# Patient Record
Sex: MALE | Race: BLACK OR AFRICAN AMERICAN | Employment: UNEMPLOYED | URBAN - METROPOLITAN AREA
[De-identification: names, ages, dates, MRNs, and addresses within clinical notes are randomized per-mention and may not be internally consistent; named-entity substitution may affect disease eponyms.]

---

## 2017-01-31 ENCOUNTER — OFFICE VISIT (OUTPATIENT)
Dept: INTERNAL MEDICINE CLINIC | Age: 42
End: 2017-01-31

## 2017-01-31 VITALS
HEIGHT: 67 IN | SYSTOLIC BLOOD PRESSURE: 128 MMHG | HEART RATE: 91 BPM | BODY MASS INDEX: 22.84 KG/M2 | TEMPERATURE: 98.1 F | RESPIRATION RATE: 18 BRPM | WEIGHT: 145.5 LBS | OXYGEN SATURATION: 100 % | DIASTOLIC BLOOD PRESSURE: 65 MMHG

## 2017-01-31 DIAGNOSIS — E78.5 HYPERLIPIDEMIA, UNSPECIFIED HYPERLIPIDEMIA TYPE: ICD-10-CM

## 2017-01-31 DIAGNOSIS — M79.672 LEFT FOOT PAIN: Primary | ICD-10-CM

## 2017-01-31 LAB
CHOLEST SERPL-MCNC: 220 MG/DL
HDLC SERPL-MCNC: 29 MG/DL
LDL CHOLESTEROL POC: 149
NON-HDL GOAL (POC): 191
TCHOL/HDL RATIO (POC): 7.5
TRIGL SERPL-MCNC: 208 MG/DL

## 2017-01-31 RX ORDER — TRAZODONE HYDROCHLORIDE 100 MG/1
100 TABLET ORAL
Refills: 0 | COMMUNITY
Start: 2016-12-14 | End: 2017-08-28

## 2017-01-31 RX ORDER — FLUOXETINE HYDROCHLORIDE 20 MG/1
CAPSULE ORAL
Refills: 0 | COMMUNITY
Start: 2016-12-14 | End: 2017-08-28

## 2017-01-31 RX ORDER — OXYCODONE HYDROCHLORIDE 5 MG/1
TABLET ORAL
Refills: 0 | COMMUNITY
Start: 2016-11-21 | End: 2019-07-02

## 2017-01-31 RX ORDER — DEXTROMETHORPHAN HYDROBROMIDE, GUAIFENESIN 5; 100 MG/5ML; MG/5ML
650 LIQUID ORAL
Qty: 100 TAB | Refills: 3 | Status: SHIPPED | OUTPATIENT
Start: 2017-01-31

## 2017-01-31 RX ORDER — ALBUTEROL SULFATE 90 UG/1
AEROSOL, METERED RESPIRATORY (INHALATION)
Refills: 0 | COMMUNITY
Start: 2016-11-09

## 2017-01-31 RX ORDER — HYDROXYZINE PAMOATE 25 MG/1
25 CAPSULE ORAL
Refills: 0 | COMMUNITY
Start: 2016-12-14 | End: 2017-08-28 | Stop reason: SDUPTHER

## 2017-01-31 RX ORDER — OXYCODONE HYDROCHLORIDE 5 MG/1
TABLET ORAL
Refills: 0 | Status: CANCELLED | OUTPATIENT
Start: 2017-01-31

## 2017-01-31 NOTE — PROGRESS NOTES
1. Have you been to the ER, urgent care clinic since your last visit? Hospitalized since your last visit? No    2. Have you seen or consulted any other health care providers outside of the [address] [place] [address] since your last visit? Include any pap smears or colon screening. No      Pt is here for   Chief Complaint   Patient presents with    Foot Pain     pt states left foot. .. pt states he had surgery on his foot in october. . pt states he has contacted the surgeon to inform him that he's still having pain. .. pt states he was told to contact his PCP     Pt states pain level is a 9 left foot. ..  Pt states he hasn't had anything for pain in quite sometime

## 2017-01-31 NOTE — MR AVS SNAPSHOT
Visit Information Date & Time Provider Department Dept. Phone Encounter #  
 1/31/2017  1:20 PM Bonnye Dakins, NP [address] [facility] [phone] [id number] Follow-up Instructions Return in about 3 months (around 4/30/2017) for hyperlipidemia. Upcoming Health Maintenance Date Due DTaP/Tdap/Td series (2 - Td) 10/10/2026 Allergies as of 1/31/2017  Review Complete On: 1/31/2017 By: Bonnye Dakins, NP Severity Noted Reaction Type Reactions Ibuprofen  07/26/2014    Itching Pcn [Penicillins]  07/26/2014    Itching Current Immunizations  Reviewed on 2/20/2015 Name Date Influenza Vaccine 11/12/2013 Influenza Vaccine Split 10/31/2012 TB Skin Test (PPD) Intradermal 1/23/2015 Not reviewed this visit You Were Diagnosed With   
  
 Codes Comments Left foot pain    -  Primary ICD-10-CM: R89.060 ICD-9-CM: 729.5 Hyperlipidemia, unspecified hyperlipidemia type     ICD-10-CM: E78.5 ICD-9-CM: 272.4 Vitals BP Pulse Temp Resp Height(growth percentile) Weight(growth percentile) 128/65 (BP 1 Location: Right arm, BP Patient Position: Sitting) 91 98.1 °F (36.7 °C) (Oral) 18 5' 7\" (1.702 m) 145 lb 8 oz (66 kg) SpO2 BMI Smoking Status 100% 22.79 kg/m2 Current Every Day Smoker Vitals History BMI and BSA Data Body Mass Index Body Surface Area  
 22.79 kg/m 2 1.77 m 2 Preferred Pharmacy Pharmacy Name Phone [id number] [facility] [address] [place] [address] Your Updated Medication List  
  
   
This list is accurate as of: 1/31/17  2:22 PM.  Always use your most recent med list.  
  
  
  
  
 ABILIFY 10 mg tablet Generic drug:  ARIPiprazole Take 10 mg by mouth daily. acetaminophen 650 mg CR tablet Commonly known as:  TYLENOL ARTHRITIS PAIN Take 1 Tab by mouth three (3) times daily as needed for Pain.  Indications: ARTHRITIC PAIN  
  
 atenolol 25 mg tablet Commonly known as:  TENORMIN Take 1 Tab by mouth daily. Indications: HYPERTENSION  
  
 atorvastatin 40 mg tablet Commonly known as:  LIPITOR Take 1 Tab by mouth daily. Indications: cholesterol  
  
 ergocalciferol 50,000 unit capsule Commonly known as:  ERGOCALCIFEROL Take 1 Cap by mouth every seven (7) days. Indications: VITAMIN D DEFICIENCY FLUoxetine 20 mg capsule Commonly known as:  PROzac  
  
 guaiFENesin  mg SR tablet Commonly known as:  Mookie & Mookie Take 1 Tab by mouth two (2) times daily as needed for Congestion. hydrOXYzine pamoate 25 mg capsule Commonly known as:  VISTARIL  
  
 ipratropium 17 mcg/actuation inhaler Commonly known as:  ATROVENT HFA Take 2 puffs TWICE DAILY, up to four times daily for shortness of breath and wheezing. Indications: BRONCHOSPASM PREVENTION WITH COPD  
  
 lisinopril 20 mg tablet Commonly known as:  Delsie Commander Take 1 Tab by mouth daily. Indications: HYPERTENSION  
  
 omeprazole 40 mg capsule Commonly known as:  PRILOSEC Take 1 Cap by mouth daily. Indications: GASTROESOPHAGEAL REFLUX, HEARTBURN  
  
 oxyCODONE IR 5 mg immediate release tablet Commonly known as:  ROXICODONE  
take 1 tablet by mouth every 4 hours if needed for pain  
  
 traZODone 100 mg tablet Commonly known as:  DESYREL  
  
 VENTOLIN HFA 90 mcg/actuation inhaler Generic drug:  albuterol  
inhale 2 puffs by mouth every 6 hours if needed for BREATH OR WHEEZING  
  
 WELLBUTRIN 100 mg tablet Generic drug:  buPROPion Take  by mouth. Prescriptions Sent to Pharmacy Refills  
 acetaminophen (TYLENOL ARTHRITIS PAIN) 650 mg CR tablet 3 Sig: Take 1 Tab by mouth three (3) times daily as needed for Pain. Indications: ARTHRITIC PAIN Class: Normal  
 [facility] [address] Ph #: [phone] Route: Oral  
  
We Performed the Following AMB POC LIPID PROFILE [24233 CPT(R)] Follow-up Instructions Return in about 3 months (around 4/30/2017) for hyperlipidemia. Patient Instructions Foot Pain: Care Instructions Your Care Instructions Foot injuries that cause pain and swelling are fairly common. Almost all sports or home repair projects can cause a misstep that ends up as foot pain. Normal wear and tear, especially as you get older, also can cause foot pain. Most minor foot injuries will heal on their own, and home treatment is usually all you need to do. If you have a severe injury, you may need tests and treatment. Follow-up care is a key part of your treatment and safety. Be sure to make and go to all appointments, and call your doctor if you are having problems. Its also a good idea to know your test results and keep a list of the medicines you take. How can you care for yourself at home? · Take pain medicines exactly as directed. ¨ If the doctor gave you a prescription medicine for pain, take it as prescribed. ¨ If you are not taking a prescription pain medicine, ask your doctor if you can take an over-the-counter medicine. · Rest and protect your foot. Take a break from any activity that may cause pain. · Put ice or a cold pack on your foot for 10 to 20 minutes at a time. Put a thin cloth between the ice and your skin. · Prop up the sore foot on a pillow when you ice it or anytime you sit or lie down during the next 3 days. Try to keep it above the level of your heart. This will help reduce swelling. · Your doctor may recommend that you wrap your foot with an elastic bandage. Keep your foot wrapped for as long as your doctor advises. · If your doctor recommends crutches, use them as directed. · Wear roomy footwear. · As soon as pain and swelling end, begin gentle exercises of your foot. Your doctor can tell you which exercises will help. When should you call for help? Call 911 anytime you think you may need emergency care. For example, call if: 
· Your foot turns pale, white, blue, or cold. Call your doctor now or seek immediate medical care if: 
· You cannot move or stand on your foot. · Your foot looks twisted or out of its normal position. · Your foot is not stable when you step down. · You have signs of infection, such as: 
¨ Increased pain, swelling, warmth, or redness. ¨ Red streaks leading from the sore area. ¨ Pus draining from a place on your foot. ¨ A fever. · Your foot is numb or tingly. Watch closely for changes in your health, and be sure to contact your doctor if: 
· You do not get better as expected. · You have bruises from an injury that last longer than 2 weeks. Where can you learn more? Go to http://sonia-brenda.info/. Enter V003 in the search box to learn more about \"Foot Pain: Care Instructions. \" Current as of: May 23, 2016 Content Version: 11.1 © 8700-9601 [employer]. Care instructions adapted under license by [employer] (which disclaims liability or warranty for this information). If you have questions about a medical condition or this instruction, always ask your healthcare professional. [place] [address] any warranty or liability for your use of this information. Introducing [facility]! [facility] [facility] introduces K[employer]Ex patient portal. Now you can access parts of your medical record, email your doctor's office, and request medication refills online. 1. In your internet browser, go to [email or web address] 2. Click on the First Time User? Click Here link in the Sign In box. You will see the New Member Sign Up page. 3. Enter your [employer] Access Code exactly as it appears below. You will not need to use this code after youve completed the sign-up process. If you do not sign up before the expiration date, you must request a new code. · [employer] Access Code: [email or web address] Expires: 5/1/2017  1:17 PM 
 
4. Enter the last four digits of your Social Security Number (xxxx) and Date of Birth (mm/dd/yyyy) as indicated and click Submit. You will be taken to the next sign-up page. 5. Create a [employer] ID. This will be your [employer] login ID and cannot be changed, so think of one that is secure and easy to remember. 6. Create a [employer] password. You can change your password at any time. 7. Enter your Password Reset Question and Answer. This can be used at a later time if you forget your password. 8. Enter your e-mail address. You will receive e-mail notification when new information is available in [address] 9. Click Sign Up. You can now view and download portions of your medical record. 10. Click the Download Summary menu link to download a portable copy of your medical information. If you have questions, please visit the Frequently Asked Questions section of the [employer] website. Remember, [employer] is NOT to be used for urgent needs. For medical emergencies, dial 911. Now available from your iPhone and Android! Please provide this summary of care documentation to your next provider. Your primary care clinician is listed as VLAD Abel. If you have any questions after today's visit, please call [phone].

## 2017-01-31 NOTE — PATIENT INSTRUCTIONS

## 2017-01-31 NOTE — PROGRESS NOTES
Michelle Grier is a 35 y.o. male and presents with Foot Pain (pt states left foot. .. pt states he had surgery on his foot in october. . pt states he has contacted the surgeon to inform him that he's still having pain. .. pt states he was told to contact his PCP)    Subjective:  Pt here chronic left foot pain. No meds tried to help with pain since \"not helpful\", taken once weekly. Had \"surgery\" November 2016 by Dr. Quin Garcia 2 tabs only. Also NOT taking cholesterol med, since \"didn't feel like taking.     Review of Systems  Constitutional: negative for fevers, chills, anorexia and weight loss  Respiratory:  negative for cough, hemoptysis, dyspnea, and wheezing  CV:   negative for chest pain, palpitations, and lower extremity edema  GI:   negative for nausea, vomiting, diarrhea, abdominal pain, and melena  Integument:  negative for rash, ulcerations, and pruritus  Hematologic:  negative for easy bruising and bleeding  Musculoskel: negative for muscle weakness and joint pain/swelling  Neurological:  negative for headaches, dizziness, vertigo,and memory/gait problems  Behavl/Psych: negative for feelings of anxiety, depression, suicide, and mood changes    Past Medical History   Diagnosis Date    Bipolar affective ([facility] [address]     Depression     Gastrointestinal disorder      hernia, ulcer    GERD (gastroesophageal reflux disease)     Learning disorder     Migraine headache     Umbilical hernia 52/00/4256     Past Surgical History   Procedure Laterality Date    Hx hernia repair       Social History     Social History    Marital status: SINGLE     Spouse name: N/A    Number of children: N/A    Years of education: N/A     Social History Main Topics    Smoking status: Current Every Day Smoker     Packs/day: 0.25    Smokeless tobacco: Never Used    Alcohol use No    Drug use: No    Sexual activity: No     Other Topics Concern    None     Social History Narrative     Family History   Problem Relation Age of Onset  No Known Problems Mother     No Known Problems Father      Current Outpatient Prescriptions   Medication Sig Dispense Refill    FLUoxetine (PROZAC) 20 mg capsule   0    VENTOLIN HFA 90 mcg/actuation inhaler inhale 2 puffs by mouth every 6 hours if needed for BREATH OR WHEEZING  0    hydrOXYzine pamoate (VISTARIL) 25 mg capsule   0    traZODone (DESYREL) 100 mg tablet   0    acetaminophen (TYLENOL ARTHRITIS PAIN) 650 mg CR tablet Take 1 Tab by mouth three (3) times daily as needed for Pain. Indications: ARTHRITIC PAIN 100 Tab 3    ipratropium (ATROVENT HFA) 17 mcg/actuation inhaler Take 2 puffs TWICE DAILY, up to four times daily for shortness of breath and wheezing. Indications: BRONCHOSPASM PREVENTION WITH COPD 1 Inhaler 5    lisinopril (PRINIVIL, ZESTRIL) 20 mg tablet Take 1 Tab by mouth daily. Indications: HYPERTENSION 30 Tab 11    ergocalciferol (ERGOCALCIFEROL) 50,000 unit capsule Take 1 Cap by mouth every seven (7) days. Indications: VITAMIN D DEFICIENCY 12 Cap 3    atorvastatin (LIPITOR) 40 mg tablet Take 1 Tab by mouth daily. Indications: cholesterol 30 Tab 11    atenolol (TENORMIN) 25 mg tablet Take 1 Tab by mouth daily. Indications: HYPERTENSION 30 Tab 11    omeprazole (PRILOSEC) 40 mg capsule Take 1 Cap by mouth daily. Indications: GASTROESOPHAGEAL REFLUX, HEARTBURN 30 Cap 11    ARIPiprazole (ABILIFY) 10 mg tablet Take 10 mg by mouth daily.  buPROPion (WELLBUTRIN) 100 mg tablet Take  by mouth.  oxyCODONE IR (ROXICODONE) 5 mg immediate release tablet take 1 tablet by mouth every 4 hours if needed for pain  0    guaiFENesin SR (MUCINEX) 600 mg SR tablet Take 1 Tab by mouth two (2) times daily as needed for Congestion.  30 Tab 2     Allergies   Allergen Reactions    Ibuprofen Itching    Pcn [Penicillins] Itching       Objective:  Visit Vitals    /65 (BP 1 Location: Right arm, BP Patient Position: Sitting)    Pulse 91    Temp 98.1 °F (36.7 °C) (Oral)    Resp 18    Ht 5' 7\" (1.702 m)    Wt 145 lb 8 oz (66 kg)    SpO2 100%    BMI 22.79 kg/m2     Wt Readings from Last 3 Encounters:   01/31/17 145 lb 8 oz (66 kg)   10/10/16 155 lb 9.6 oz (70.6 kg)   05/23/16 164 lb 11.2 oz (74.7 kg)     Physical Exam:   General appearance - alert, well appearing, and in no distress. Mental status - A/O x 4, normal mood and affect. Neck -Supple ,normal CSP. FROM, non-tender. No significant adenopathy/thyromegaly. No JVD. Chest - CTA. Symmetric chest rise. No wheezing, rales or rhonchi. Heart - Normal rate, regular rhythm. Normal S1, S2. No MGR or clicks. Abdomen - Soft,non-distended. Normoactive BS in all quadrants. NT, no mass or HSM. Ext- Radial, DP pulses, 2+ bilaterally. No pedal edema, clubbing, or cyanosis. Scar along left medial foot. Skin-Warm and dry. No hyperpigmentation, ulcerations, or suspicious lesions. Neuro - Normal speech, no focal findings or movement disorder. Normal strength, gait, and muscle tone. Results for orders placed or performed in visit on 01/31/17   AMB POC LIPID PROFILE   Result Value Ref Range    Cholesterol (POC) 220     Triglycerides (POC) 208     HDL Cholesterol (POC) 29     LDL Cholesterol (POC) 149     Non-HDL Goal (POC) 191     TChol/HDL Ratio (POC) 7.5      Assessment/Plan:  Tylenol arthritis TID. Also advised to wear shoe advised to get by podiatry. Pt became visibly upset when made aware of plan NOT to resume opiate medication, planning to seek care and pain meds elsewhere. Advised to return in 3 months for lipids if he changes his mind. Non-adherent to treatment plan. See below for other orders   Follow-up Disposition: Not on File      ICD-10-CM ICD-9-CM    1. Left foot pain M79.672 729.5 acetaminophen (TYLENOL ARTHRITIS PAIN) 650 mg CR tablet   2.  Hyperlipidemia, unspecified hyperlipidemia type E78.5 272.4 AMB POC LIPID PROFILE     Orders Placed This Encounter    AMB POC LIPID PROFILE    FLUoxetine (PROZAC) 20 mg capsule     Refill:  0  VENTOLIN HFA 90 mcg/actuation inhaler     Sig: inhale 2 puffs by mouth every 6 hours if needed for BREATH OR WHEEZING     Refill:  0    hydrOXYzine pamoate (VISTARIL) 25 mg capsule     Refill:  0    traZODone (DESYREL) 100 mg tablet     Refill:  0    oxyCODONE IR (ROXICODONE) 5 mg immediate release tablet     Sig: take 1 tablet by mouth every 4 hours if needed for pain     Refill:  0    acetaminophen (TYLENOL ARTHRITIS PAIN) 650 mg CR tablet     Sig: Take 1 Tab by mouth three (3) times daily as needed for Pain. Indications: ARTHRITIC PAIN     Dispense:  100 Tab     Refill:  3       Rommel Damico expressed understanding of plan. An After Visit Summary was offered/printed and given to the patient.

## 2017-08-10 ENCOUNTER — HOSPITAL ENCOUNTER (OUTPATIENT)
Dept: GENERAL RADIOLOGY | Age: 42
Discharge: HOME | End: 2017-08-10
Payer: MEDICAID

## 2017-08-10 DIAGNOSIS — M41.9 SCOLIOSIS: ICD-10-CM

## 2017-08-10 PROCEDURE — 72100 X-RAY EXAM L-S SPINE 2/3 VWS: CPT

## 2017-08-22 ENCOUNTER — HOSPITAL ENCOUNTER (OUTPATIENT)
Dept: PHYSICAL THERAPY | Age: 42
Discharge: HOME | End: 2017-08-22
Payer: MEDICAID

## 2017-08-22 PROCEDURE — 97161 PT EVAL LOW COMPLEX 20 MIN: CPT

## 2017-08-22 PROCEDURE — 97110 THERAPEUTIC EXERCISES: CPT
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
## 2017-08-28 ENCOUNTER — OFFICE VISIT (OUTPATIENT)
Dept: BEHAVIORAL/MENTAL HEALTH CLINIC | Age: 42
End: 2017-08-28

## 2017-08-28 VITALS
OXYGEN SATURATION: 98 % | HEIGHT: 67 IN | WEIGHT: 151 LBS | HEART RATE: 68 BPM | DIASTOLIC BLOOD PRESSURE: 78 MMHG | SYSTOLIC BLOOD PRESSURE: 127 MMHG | BODY MASS INDEX: 23.7 KG/M2

## 2017-08-28 DIAGNOSIS — F39 SEVERE MOOD DISORDER WITH PSYCHOTIC FEATURES (HCC): ICD-10-CM

## 2017-08-28 DIAGNOSIS — F60.3 BORDERLINE PERSONALITY DISORDER (HCC): ICD-10-CM

## 2017-08-28 DIAGNOSIS — F39 MOOD DISORDER (HCC): Primary | ICD-10-CM

## 2017-08-28 DIAGNOSIS — Z78.9 CAFFEINE USE: ICD-10-CM

## 2017-08-28 DIAGNOSIS — F40.10 SOCIAL ANXIETY DISORDER: ICD-10-CM

## 2017-08-28 RX ORDER — RISPERIDONE 1 MG/1
TABLET, FILM COATED ORAL
Qty: 30 TAB | Refills: 0 | Status: SHIPPED | OUTPATIENT
Start: 2017-08-28 | End: 2017-09-26 | Stop reason: SDUPTHER

## 2017-08-28 RX ORDER — PAROXETINE HYDROCHLORIDE 20 MG/1
TABLET, FILM COATED ORAL
Qty: 30 TAB | Refills: 0 | Status: SHIPPED | OUTPATIENT
Start: 2017-08-28 | End: 2017-09-26 | Stop reason: SDUPTHER

## 2017-08-28 RX ORDER — ASPIRIN 81 MG/1
81 TABLET ORAL DAILY
Refills: 0 | COMMUNITY
Start: 2017-08-07

## 2017-08-28 RX ORDER — HYDROXYZINE PAMOATE 25 MG/1
25 CAPSULE ORAL
Qty: 90 CAP | Refills: 0 | Status: SHIPPED | OUTPATIENT
Start: 2017-08-28 | End: 2017-09-26 | Stop reason: SDUPTHER

## 2017-08-28 NOTE — PATIENT INSTRUCTIONS
For reliable dietary information, go to www. EATRIGHT.org. Sleep Tips    What to avoid    · Do not have drinks with caffeine, such as coffee or black tea, for 8 hours before bed. · Do not smoke or use other types of tobacco near bedtime. Nicotine is a stimulant and can keep you awake. · Avoid drinking alcohol late in the evening, because it can cause you to wake in the middle of the night. · Do not eat a big meal close to bedtime. If you are hungry, eat a light snack. · Do not drink a lot of water close to bedtime, because the need to urinate may wake you up during the night. · Do not read or watch TV in bed. Use the bed only for sleeping and sexual activity. What to try    · Go to bed at the same time every night, and wake up at the same time every morning. Do not take naps during the day. · Keep your bedroom quiet, dark, and cool. · Get regular exercise, but not within 3 to 4 hours of your bedtime. · Sleep on a comfortable pillow and mattress. · If watching the clock makes you anxious, turn it facing away from you so you cannot see the time. · If you worry when you lie down, start a worry book. Well before bedtime, write down your worries, and then set the book and your concerns aside. · Try meditation or other relaxation techniques before you go to bed. · If you cannot fall asleep, get up and go to another room until you feel sleepy. Do something relaxing. Repeat your bedtime routine before you go to bed again. Make your house quiet and calm about an hour before bedtime. Turn down the lights, turn off the TV, log off the computer, and turn down the volume on music. This can help you relax after a busy day. Learning About Mood Disorders  What are mood disorders? Mood disorders are medical problems that affect how you feel. They can impact your moods, thoughts, and actions. Mood disorders include:  · Depression. This causes you to feel sad or hopeless for much of the time. · Bipolar disorder. This causes extreme mood changes from manic episodes of very high energy to extreme lows of depression. · Seasonal affective disorder (SAD). This is a type of depression that affects you during the same season each year. Most often people experience SAD during the fall and winter months when days are shorter and there is less light. What are the symptoms? Depression  You may:  · Feel sad or hopeless nearly every day. · Lose interest in or not get pleasure from most daily activities. You feel this way nearly every day. · Have low energy, changes in your appetite, or changes in how well you sleep. · Have trouble concentrating. · Think about death and suicide. Keep the numbers for these national suicide hotlines: 3-500-383-TALK (2-513.284.8102) and 6-614-ZLCVNYD (0-256.416.6029). If you or someone you know talks about suicide or feeling hopeless, get help right away. Bipolar disorder  Symptoms depend on your mood swings. You may:  · Feel very happy, energetic, or on edge. · Feel like you need very little sleep. · Feel overly self-confident. · Do impulsive things, such as spending a lot of money. · Feel sad or hopeless. · Have racing thoughts or trouble thinking and making decisions. · Lose interest in things you have enjoyed in the past.  · Think about death and suicide. Keep the numbers for these national suicide hotlines: 9-703-552-TALK (5-284.263.9041) and 0-623-EZCEWRC (7-727.490.7571). If you or someone you know talks about suicide or feeling hopeless, get help right away. Seasonal affective disorder (SAD)  Symptoms come and go at about the same time each year. For most people with SAD, symptoms come during the winter when there is less daylight. You may:  · Feel sad, grumpy, cameron, or anxious. · Lose interest in your usual activities. · Eat more and crave carbohydrates, such as bread and pasta. · Gain weight. · Sleep more and feel drowsy during the daytime.   How are mood disorders treated? Mood disorders can be treated with medicines or counseling, or a combination of both. Medicines for depression and SAD may include antidepressants. Medicines for bipolar disorder may include:  · Mood stabilizers. · Antipsychotics. · Benzodiazepines. Counseling may involve cognitive-behavioral therapy. It teaches you how to change the ways you think and behave. This can help you stop thinking bad thoughts about yourself and your life. Light therapy is the main treatment for SAD. This therapy uses a special kind of lamp. You let the lamp shine on you at certain times, usually in the morning. This may help your symptoms during the months when there is less sunlight. Healthy lifestyle  Healthy lifestyle changes may help you feel better. · Get at least 30 minutes of exercise on most days of the week. Walking is a good choice. · Eat a healthy diet. Include fruits, vegetables, lean proteins, and whole grains in your diet each day. · Keep a regular sleep schedule. Try for 8 hours of sleep a night. · Find ways to manage stress, such as relaxation exercises. · Avoid alcohol and illegal drugs. Follow-up care is a key part of your treatment and safety. Be sure to make and go to all appointments, and call your doctor if you are having problems. It's also a good idea to know your test results and keep a list of the medicines you take. Where can you learn more? Go to http://sonia-brenda.info/. Enter W116 in the search box to learn more about \"Learning About Mood Disorders. \"  Current as of: May 3, 2017  Content Version: 11.3  © 7104-0482 [employer]. Care instructions adapted under license by [employer] (which disclaims liability or warranty for this information).  If you have questions about a medical condition or this instruction, always ask your healthcare professional. [place] [address] any warranty or liability for your use of this information. Learning About Mood Disorders  What are mood disorders? Mood disorders are medical problems that affect how you feel. They can impact your moods, thoughts, and actions. Mood disorders include:  · Depression. This causes you to feel sad or hopeless for much of the time. · Bipolar disorder. This causes extreme mood changes from manic episodes of very high energy to extreme lows of depression. · Seasonal affective disorder (SAD). This is a type of depression that affects you during the same season each year. Most often people experience SAD during the fall and winter months when days are shorter and there is less light. What are the symptoms? Depression  You may:  · Feel sad or hopeless nearly every day. · Lose interest in or not get pleasure from most daily activities. You feel this way nearly every day. · Have low energy, changes in your appetite, or changes in how well you sleep. · Have trouble concentrating. · Think about death and suicide. Keep the numbers for these national suicide hotlines: 8-976-513-TALK (6-390.879.9969) and 7-175-KARDADW (5-774.719.2272). If you or someone you know talks about suicide or feeling hopeless, get help right away. Bipolar disorder  Symptoms depend on your mood swings. You may:  · Feel very happy, energetic, or on edge. · Feel like you need very little sleep. · Feel overly self-confident. · Do impulsive things, such as spending a lot of money. · Feel sad or hopeless. · Have racing thoughts or trouble thinking and making decisions. · Lose interest in things you have enjoyed in the past.  · Think about death and suicide. Keep the numbers for these national suicide hotlines: 0-135-057-TALK (2-261.490.3584) and 7-784-IXDEBSM (7-703.601.3737). If you or someone you know talks about suicide or feeling hopeless, get help right away. Seasonal affective disorder (SAD)  Symptoms come and go at about the same time each year.  For most people with SAD, symptoms come during the winter when there is less daylight. You may:  · Feel sad, grumpy, cameron, or anxious. · Lose interest in your usual activities. · Eat more and crave carbohydrates, such as bread and pasta. · Gain weight. · Sleep more and feel drowsy during the daytime. How are mood disorders treated? Mood disorders can be treated with medicines or counseling, or a combination of both. Medicines for depression and SAD may include antidepressants. Medicines for bipolar disorder may include:  · Mood stabilizers. · Antipsychotics. · Benzodiazepines. Counseling may involve cognitive-behavioral therapy. It teaches you how to change the ways you think and behave. This can help you stop thinking bad thoughts about yourself and your life. Light therapy is the main treatment for SAD. This therapy uses a special kind of lamp. You let the lamp shine on you at certain times, usually in the morning. This may help your symptoms during the months when there is less sunlight. Healthy lifestyle  Healthy lifestyle changes may help you feel better. · Get at least 30 minutes of exercise on most days of the week. Walking is a good choice. · Eat a healthy diet. Include fruits, vegetables, lean proteins, and whole grains in your diet each day. · Keep a regular sleep schedule. Try for 8 hours of sleep a night. · Find ways to manage stress, such as relaxation exercises. · Avoid alcohol and illegal drugs. Follow-up care is a key part of your treatment and safety. Be sure to make and go to all appointments, and call your doctor if you are having problems. It's also a good idea to know your test results and keep a list of the medicines you take. Where can you learn more? Go to http://sonia-brenda.info/. Enter X927 in the search box to learn more about \"Learning About Mood Disorders. \"  Current as of: May 3, 2017  Content Version: 11.3  © 4122-7877 [employer].  Care instructions adapted under license by [employer] (which disclaims liability or warranty for this information). If you have questions about a medical condition or this instruction, always ask your healthcare professional. [address] any warranty or liability for your use of this information.

## 2017-08-28 NOTE — PROGRESS NOTES
Ambulatory Initial Psychiatric Evaluation     Chief Complaint: \" I am depressed \"    History of Present Illness: Ian Dotson is a 35 y. o.AA male who presents with symptoms of anxiety and mood disorder. Patient reports/evidences the following emotional symptoms:  sleeping for 4-5 hrs and reported difficulty initiating sleep. Sleeping during the day. Reported increased irritability, agitated, anger issues, appetite is good, has interest and able to focus and concentrate. Reported did not sleep for 2 days , racing thoughts and feels like killing people. Last episode 2 days ago. Denied any HI towards any specific person. Understands the legal consequences and will not act on it. Reported feels down and sad at times. Denied hopelessness or helplessness or passive suicide thoughts. Denied any symptoms of  Psychosis. Denied any increased psychomotor activity or agitation. Reported auditory hallucination to kill people and his family when angry. Additional symptomatology include anxiety- difficulty breathing. The above symptoms have been present for a few years. The patient reports onset of symptoms few weeks ago. These symptoms are of high severity as per patient's report. The symptoms are  variable in nature. The patient's condition has been precipitated by and condition worsened with stressors. Stressors: financial issues, relationship issues with family    Pt denied any flashbacks, hypervigilance or avoidance or reexperience or night colin. Pt denied any h/o seizures or head trauma or neurological problems. Client denied any SI or any plan or intent; denied HI or SIB. There is no evidence of hallucinations, psychosis or steph.      Past Psychiatric History:     Previous psychiatric care: admits  Age 27's to 33- Dr Ross Rasmussen-  depression and panic attacks- Alprazolam and risperidone(sleep)- took it for 3 years  Age 33 to 26 Dr Marysol Chapman-  Alprazolam and risperidone -   Age 26 - Dr Behzad Melvin - Alprazolam and risperidone   Age 26 till now - not on any medications    Previous suicide attempts: Cut locations: hand(s) bilateral x 2- age 27 and 26    Previous self injurious behavior: No    Previous Hospitalizations: denies    Current psychotropics: none          Previous psychiatric medications/ECT/TMS: admits  Alprazolam, abilify, bupropion, trazodone          History of rehab, detox, withdrawal: denied    Social History:   Social History     Social History    Marital status: SINGLE     Spouse name: N/A    Number of children: N/A    Years of education: N/A     Social History Main Topics    Smoking status: Current Every Day Smoker     Packs/day: 0.25    Smokeless tobacco: Never Used    Alcohol use No    Drug use: No    Sexual activity: No     Other Topics Concern    None     Social History Narrative        Ethnic:   Relationship Status: single  Kids: 4- ages 13, 9, 25, 5   Living Situation: cousin  Born: [place], [place]  Raised by: mother  Siblings: 4  Education: graduated high school  Employment: on permanent disability-) learning disability), [employer]   Tobacco:  tobacco use: smoked 2 packs per day(s) for 24 years  Caffeine: caffeine intake: 10 cups of caffeinated coffee per day(s), 2 litres of pepsi a day. Alcohol: no alcohol use  Illicit Drug Social History: In past smoked marijuana  Hobbies:  music   Abuse: denied  Sexual:  heterosexual  Support:family  Legal: denied    Family History:   Family History   Problem Relation Age of Onset    No Known Problems Mother     No Known Problems Father         Family Psychiatric history: Theres no formal diagnosed psychiatric illness in the family. There is no history of suicide attempt in the family.     Past Medical History:   Past Medical History:   Diagnosis Date    Bipolar affective ([facility] [address]     Depression     Gastrointestinal disorder     hernia, ulcer    GERD (gastroesophageal reflux disease)     Learning disorder     Migraine headache     Umbilical hernia 49/33/8166         Allergies: Allergies   Allergen Reactions    Ibuprofen Itching    Pcn [Penicillins] Itching        Medication List:   Current Outpatient Prescriptions   Medication Sig Dispense Refill    aspirin delayed-release 81 mg tablet Take 81 mg by mouth daily. 0    hydrOXYzine pamoate (VISTARIL) 25 mg capsule Take 1 Cap by mouth three (3) times daily as needed for Anxiety. 90 Cap 0    risperiDONE (RISPERDAL) 1 mg tablet Please take half tablet daily for 1 week at bed time and then take 1 tab at bed time 30 Tab 0    PARoxetine (PAXIL) 20 mg tablet Please take half tab,let daily x 5 days and then take 1 daily 30 Tab 0    lisinopril (PRINIVIL, ZESTRIL) 20 mg tablet take 1 tablet by mouth once daily for high blood pressure 30 Tab 0    atorvastatin (LIPITOR) 40 mg tablet take 1 tablet by mouth once daily for cholesterol 30 Tab 0    atenolol (TENORMIN) 25 mg tablet take 1 tablet by mouth once daily for high blood pressure 30 Tab 0    omeprazole (PRILOSEC) 40 mg capsule take 1 capsule by mouth once daily 30 Cap 0    VENTOLIN HFA 90 mcg/actuation inhaler inhale 2 puffs by mouth every 6 hours if needed for BREATH OR WHEEZING  0    acetaminophen (TYLENOL ARTHRITIS PAIN) 650 mg CR tablet Take 1 Tab by mouth three (3) times daily as needed for Pain. Indications: ARTHRITIC PAIN 100 Tab 3    ipratropium (ATROVENT HFA) 17 mcg/actuation inhaler Take 2 puffs TWICE DAILY, up to four times daily for shortness of breath and wheezing. Indications: BRONCHOSPASM PREVENTION WITH COPD 1 Inhaler 5    guaiFENesin SR (MUCINEX) 600 mg SR tablet Take 1 Tab by mouth two (2) times daily as needed for Congestion. 30 Tab 2    ergocalciferol (ERGOCALCIFEROL) 50,000 unit capsule Take 1 Cap by mouth every seven (7) days.  Indications: VITAMIN D DEFICIENCY 12 Cap 3    oxyCODONE IR (ROXICODONE) 5 mg immediate release tablet take 1 tablet by mouth every 4 hours if needed for pain  0        A comprehensive review of systems was negative except for that written in the HPI. Psychiatric/Mental Status Examination:     MENTAL STATUS EXAM:  Sensorium  oriented to time, place and person   Orientation person, place, time/date, situation, day of week, month of year and year   Relations unreliable   Eye Contact appropriate   Appearance:  age appropriate, casually dressed and within normal Limits   Motor Behavior:  gait stable and within normal limits   Speech:  normal pitch and normal volume   Vocabulary average   Thought Process: goal directed, logical and within normal limits   Thought Content free of delusions and free of hallucinations   Suicidal ideations no plan , no intention and none   Homicidal ideations no plan , no intention and none   Mood:  anxious and irritable   Affect:  anxious, irritable and mood-congruent   Memory recent  adequate   Memory remote:  adequate   Concentration:  adequate   Abstraction:  abstract   Insight:  fair   Reliability questionable   Judgment:  fair     Labs:  Results for orders placed or performed in visit on 01/31/17   AMB POC LIPID PROFILE   Result Value Ref Range    Cholesterol (POC) 220     Triglycerides (POC) 208     HDL Cholesterol (POC) 29     LDL Cholesterol (POC) 149     Non-HDL Goal (POC) 191     TChol/HDL Ratio (POC) 7.5          Assessment:  reviewed: filled oxycodone 11/21/16  The client, Lily Fatima is a 35 y. o.AA male presents with mood disorder , anxiety with ? BoPD. Client is a poor historian and didnot bring and medical records from previous providers. Client reported h/o mood disorder, depression and panic attacks for a long time. Had tial of risperidone, abilify, fluoxetine, bupropion, alprazolam from Dr Patric Lefort, Dr. Jamel Peralta. Reported had benefits with Risperidone. Client reported panic attacks, difficulty initiating sleep, labile mood, racing thoughts, homicidal ideations, irritability, anger and agitation. Has occasional AH of hurting people and family due to increased frustration. Reported feels anxious and paranoid around people. People are watching him. Reported feels agitated and feels like robbing and hurting people when angry. Denied any HI towards any specific person and understands legal consequences. Denied any SI or AVH at this time. Plan to begin Risperidone to target mood, AH and paranoia. Reviewed labs and records. Patient denies SI/HI/SIB. No evidence of AH/VH or delusions. Diagnosis: Mood disorder, social anxiety ds, heavy caffeine use, R/o Borderline personality disorder, R/o antisocial personality ds. R/o major depression severe with psychotic features    Treatment Plan:   1. Medication: begin Paxil 10 mg daily x 4 days and then take 20 mg daily                          Begin Risperidone 0.5 mg for 5 days and then take 1 mg HS                          Begin Hydroxyzine 25 mg TID prn    2. Discussed:  the risks and benefits of the proposed medication  the potential medication side effects  dry mouth, GI disturbance, headache, impotence, libido decreased, weight gain, somnolence, tardive dyskinesia  patient given opportunity to ask questions  off label use of an approved drug/prescription discussed with patient   3. Psychotherapy: Recommended: CBT-not willing at this time  4. Medical: PCP- Dr Jacquie Giles  5. Return to Clinic: Follow-up Disposition:  Return in about 4 weeks (around 9/25/2017) for med check and follow up. or sooner prn    The risk versus benefits of treatment were discussed and side effects explained. Patient agreed with plan. Patient instructed to call with any side effects.   - Instructed patient to call the clinic, and if after hours call the provider on call if experiences any suicidal thought or ideas to hurt herself or other. Also instructed to call 911 or go to the ED. Patient verbalized understanding and agreed to call.       Time spent with Patient:  30 to 74 minutes    Marguerite Woodson NP  8/28/2017

## 2017-08-28 NOTE — MR AVS SNAPSHOT
Visit Information Date & Time Provider Department Dept. Phone Encounter #  
 8/28/2017 11:00 AM Velia Pierson [facility] [phone] [id number] Follow-up Instructions Return in about 4 weeks (around 9/25/2017) for med check and follow up. Follow-up and Disposition History Your Appointments 9/25/2017 11:30 AM  
ESTABLISHED PATIENT with [facility]ia [facility] ([facility]) Appt Note: 4 week follow-up [address] [facility] [address] [facility] Anna Perez [address]  
  
   
 [address] [facility] [address] [facility] [address] [address] Upcoming Health Maintenance Date Due INFLUENZA AGE 9 TO ADULT 8/1/2017 DTaP/Tdap/Td series (2 - Td) 10/10/2026 Allergies as of 8/28/2017  Review Complete On: 8/28/2017 By: Velia Pierson NP Severity Noted Reaction Type Reactions Ibuprofen  07/26/2014    Itching Pcn [Penicillins]  07/26/2014    Itching Current Immunizations  Reviewed on 2/20/2015 Name Date Influenza Vaccine 11/12/2013 Influenza Vaccine Split 10/31/2012 TB Skin Test (PPD) Intradermal 1/23/2015 Not reviewed this visit You Were Diagnosed With   
  
 Codes Comments Mood disorder ([facility]    -  Primary ICD-10-CM: F39 
ICD-9-CM: 296.90 Social anxiety disorder     ICD-10-CM: F40.10 ICD-9-CM: 300.23 Borderline personality disorder     ICD-10-CM: F60.3 ICD-9-CM: 710.71 Caffeine use     ICD-10-CM: F15.90 ICD-9-CM: V49.89 Severe mood disorder with psychotic features ([facility]     ICD-10-CM: F39 
ICD-9-CM: 296.99 Vitals BP Pulse Height(growth percentile) Weight(growth percentile) SpO2 BMI  
 127/78 (BP 1 Location: Left arm, BP Patient Position: Sitting) 68 5' 7\" (1.702 m) 151 lb (68.5 kg) 98% 23.65 kg/m2 Smoking Status Current Every Day Smoker Vitals History BMI and BSA Data Body Mass Index Body Surface Area 23.65 kg/m 2 1.8 m 2 Preferred Pharmacy Pharmacy Name Phone [id number] [facility] [address] [place] [address] Your Updated Medication List  
  
   
This list is accurate as of: 8/28/17 12:18 PM.  Always use your most recent med list.  
  
  
  
  
 acetaminophen 650 mg CR tablet Commonly known as:  TYLENOL ARTHRITIS PAIN Take 1 Tab by mouth three (3) times daily as needed for Pain. Indications: ARTHRITIC PAIN  
  
 aspirin delayed-release 81 mg tablet Take 81 mg by mouth daily. atenolol 25 mg tablet Commonly known as:  TENORMIN  
take 1 tablet by mouth once daily for high blood pressure  
  
 atorvastatin 40 mg tablet Commonly known as:  LIPITOR  
take 1 tablet by mouth once daily for cholesterol  
  
 ergocalciferol 50,000 unit capsule Commonly known as:  ERGOCALCIFEROL Take 1 Cap by mouth every seven (7) days. Indications: VITAMIN D DEFICIENCY  
  
 guaiFENesin  mg SR tablet Commonly known as:  Mookie & Mookie Take 1 Tab by mouth two (2) times daily as needed for Congestion. hydrOXYzine pamoate 25 mg capsule Commonly known as:  VISTARIL Take 1 Cap by mouth three (3) times daily as needed for Anxiety. ipratropium 17 mcg/actuation inhaler Commonly known as:  ATROVENT HFA Take 2 puffs TWICE DAILY, up to four times daily for shortness of breath and wheezing. Indications: BRONCHOSPASM PREVENTION WITH COPD  
  
 lisinopril 20 mg tablet Commonly known as:  Therisa Semen  
take 1 tablet by mouth once daily for high blood pressure  
  
 omeprazole 40 mg capsule Commonly known as:  PRILOSEC  
take 1 capsule by mouth once daily  
  
 oxyCODONE IR 5 mg immediate release tablet Commonly known as:  ROXICODONE  
take 1 tablet by mouth every 4 hours if needed for pain PARoxetine 20 mg tablet Commonly known as:  PAXIL Please take half tab,let daily x 5 days and then take 1 daily  
  
 risperiDONE 1 mg tablet Commonly known as:  RisperDAL Please take half tablet daily for 1 week at bed time and then take 1 tab at bed time VENTOLIN HFA 90 mcg/actuation inhaler Generic drug:  albuterol  
inhale 2 puffs by mouth every 6 hours if needed for BREATH OR WHEEZING Prescriptions Sent to Pharmacy Refills  
 hydrOXYzine pamoate (VISTARIL) 25 mg capsule 0 Sig: Take 1 Cap by mouth three (3) times daily as needed for Anxiety. Class: Normal  
 Pharmacy: [address] Ph #: [phone] Route: Oral  
 risperiDONE (RISPERDAL) 1 mg tablet 0 Sig: Please take half tablet daily for 1 week at bed time and then take 1 tab at bed time Class: Normal  
 Pharmacy: [address] Ph #: [phone] PARoxetine (PAXIL) 20 mg tablet 0 Sig: Please take half tab,let daily x 5 days and then take 1 daily Class: Normal  
 Pharmacy: [address] Ph #: [phone] Follow-up Instructions Return in about 4 weeks (around 9/25/2017) for med check and follow up. Patient Instructions For reliable dietary information, go to www. EATRIGHT.org. Sleep Tips What to avoid   
· Do not have drinks with caffeine, such as coffee or black tea, for 8 hours before bed. · Do not smoke or use other types of tobacco near bedtime. Nicotine is a stimulant and can keep you awake. · Avoid drinking alcohol late in the evening, because it can cause you to wake in the middle of the night. · Do not eat a big meal close to bedtime. If you are hungry, eat a light snack. · Do not drink a lot of water close to bedtime, because the need to urinate may wake you up during the night. · Do not read or watch TV in bed. Use the bed only for sleeping and sexual activity.  
What to try   
· Go to bed at the same time every night, and wake up at the same time every morning. Do not take naps during the day. · Keep your bedroom quiet, dark, and cool. · Get regular exercise, but not within 3 to 4 hours of your bedtime. · Sleep on a comfortable pillow and mattress. · If watching the clock makes you anxious, turn it facing away from you so you cannot see the time. · If you worry when you lie down, start a worry book. Well before bedtime, write down your worries, and then set the book and your concerns aside. · Try meditation or other relaxation techniques before you go to bed. · If you cannot fall asleep, get up and go to another room until you feel sleepy. Do something relaxing. Repeat your bedtime routine before you go to bed again. Make your house quiet and calm about an hour before bedtime. Turn down the lights, turn off the TV, log off the computer, and turn down the volume on music. This can help you relax after a busy day. Learning About Mood Disorders What are mood disorders? Mood disorders are medical problems that affect how you feel. They can impact your moods, thoughts, and actions. Mood disorders include: · Depression. This causes you to feel sad or hopeless for much of the time. · Bipolar disorder. This causes extreme mood changes from manic episodes of very high energy to extreme lows of depression. · Seasonal affective disorder (SAD). This is a type of depression that affects you during the same season each year. Most often people experience SAD during the fall and winter months when days are shorter and there is less light. What are the symptoms? Depression You may: · Feel sad or hopeless nearly every day. · Lose interest in or not get pleasure from most daily activities. You feel this way nearly every day. · Have low energy, changes in your appetite, or changes in how well you sleep. · Have trouble concentrating. · Think about death and suicide.  Keep the numbers for these national suicide hotlines: 1-800-273-TALK (1-700.774.2660) and 1-542-XGABHYN (6-769.161.5841). If you or someone you know talks about suicide or feeling hopeless, get help right away. Bipolar disorder Symptoms depend on your mood swings. You may: · Feel very happy, energetic, or on edge. · Feel like you need very little sleep. · Feel overly self-confident. · Do impulsive things, such as spending a lot of money. · Feel sad or hopeless. · Have racing thoughts or trouble thinking and making decisions. · Lose interest in things you have enjoyed in the past. 
· Think about death and suicide. Keep the numbers for these national suicide hotlines: 1-800-273-TALK (1-936.482.5079) and 4-394-KSPGMKW (2-763.388.3666). If you or someone you know talks about suicide or feeling hopeless, get help right away. Seasonal affective disorder (SAD) Symptoms come and go at about the same time each year. For most people with SAD, symptoms come during the winter when there is less daylight. You may: · Feel sad, grumpy, cameron, or anxious. · Lose interest in your usual activities. · Eat more and crave carbohydrates, such as bread and pasta. · Gain weight. · Sleep more and feel drowsy during the daytime. How are mood disorders treated? Mood disorders can be treated with medicines or counseling, or a combination of both. Medicines for depression and SAD may include antidepressants. Medicines for bipolar disorder may include: · Mood stabilizers. · Antipsychotics. · Benzodiazepines. Counseling may involve cognitive-behavioral therapy. It teaches you how to change the ways you think and behave. This can help you stop thinking bad thoughts about yourself and your life. Light therapy is the main treatment for SAD. This therapy uses a special kind of lamp. You let the lamp shine on you at certain times, usually in the morning. This may help your symptoms during the months when there is less sunlight. Healthy lifestyle Healthy lifestyle changes may help you feel better. · Get at least 30 minutes of exercise on most days of the week. Walking is a good choice. · Eat a healthy diet. Include fruits, vegetables, lean proteins, and whole grains in your diet each day. · Keep a regular sleep schedule. Try for 8 hours of sleep a night. · Find ways to manage stress, such as relaxation exercises. · Avoid alcohol and illegal drugs. Follow-up care is a key part of your treatment and safety. Be sure to make and go to all appointments, and call your doctor if you are having problems. It's also a good idea to know your test results and keep a list of the medicines you take. Where can you learn more? Go to http://sonia-brenda.info/. Enter T470 in the search box to learn more about \"Learning About Mood Disorders. \" Current as of: May 3, 2017 Content Version: 11.3 © 5809-8571 [employer]. Care instructions adapted under license by [employer] (which disclaims liability or warranty for this information). If you have questions about a medical condition or this instruction, always ask your healthcare professional. [address] any warranty or liability for your use of this information. Learning About Mood Disorders What are mood disorders? Mood disorders are medical problems that affect how you feel. They can impact your moods, thoughts, and actions. Mood disorders include: · Depression. This causes you to feel sad or hopeless for much of the time. · Bipolar disorder. This causes extreme mood changes from manic episodes of very high energy to extreme lows of depression. · Seasonal affective disorder (SAD). This is a type of depression that affects you during the same season each year. Most often people experience SAD during the fall and winter months when days are shorter and there is less light. What are the symptoms? Depression You may: · Feel sad or hopeless nearly every day. · Lose interest in or not get pleasure from most daily activities. You feel this way nearly every day. · Have low energy, changes in your appetite, or changes in how well you sleep. · Have trouble concentrating. · Think about death and suicide. Keep the numbers for these national suicide hotlines: 9-354-063-TALK (2-468-048-690.257.2317) and 1-413-KFKUECF (8-354.767.7094). If you or someone you know talks about suicide or feeling hopeless, get help right away. Bipolar disorder Symptoms depend on your mood swings. You may: · Feel very happy, energetic, or on edge. · Feel like you need very little sleep. · Feel overly self-confident. · Do impulsive things, such as spending a lot of money. · Feel sad or hopeless. · Have racing thoughts or trouble thinking and making decisions. · Lose interest in things you have enjoyed in the past. 
· Think about death and suicide. Keep the numbers for these national suicide hotlines: 1-800-273-TALK (8-559-659-546.358.8872) and 2-310-QWKEKYY (8-814.214.7462). If you or someone you know talks about suicide or feeling hopeless, get help right away. Seasonal affective disorder (SAD) Symptoms come and go at about the same time each year. For most people with SAD, symptoms come during the winter when there is less daylight. You may: · Feel sad, grumpy, cameron, or anxious. · Lose interest in your usual activities. · Eat more and crave carbohydrates, such as bread and pasta. · Gain weight. · Sleep more and feel drowsy during the daytime. How are mood disorders treated? Mood disorders can be treated with medicines or counseling, or a combination of both. Medicines for depression and SAD may include antidepressants. Medicines for bipolar disorder may include: · Mood stabilizers. · Antipsychotics. · Benzodiazepines. Counseling may involve cognitive-behavioral therapy.  It teaches you how to change the ways you think and behave. This can help you stop thinking bad thoughts about yourself and your life. Light therapy is the main treatment for SAD. This therapy uses a special kind of lamp. You let the lamp shine on you at certain times, usually in the morning. This may help your symptoms during the months when there is less sunlight. Healthy lifestyle Healthy lifestyle changes may help you feel better. · Get at least 30 minutes of exercise on most days of the week. Walking is a good choice. · Eat a healthy diet. Include fruits, vegetables, lean proteins, and whole grains in your diet each day. · Keep a regular sleep schedule. Try for 8 hours of sleep a night. · Find ways to manage stress, such as relaxation exercises. · Avoid alcohol and illegal drugs. Follow-up care is a key part of your treatment and safety. Be sure to make and go to all appointments, and call your doctor if you are having problems. It's also a good idea to know your test results and keep a list of the medicines you take. Where can you learn more? Go to http://sonia-brenda.info/. Enter V028 in the search box to learn more about \"Learning About Mood Disorders. \" Current as of: May 3, 2017 Content Version: 11.3 © 7567-0076 [employer]. Care instructions adapted under license by [employer] (which disclaims liability or warranty for this information). If you have questions about a medical condition or this instruction, always ask your healthcare professional. [place] [address] any warranty or liability for your use of this information. Patient Instructions History Introducing [address]! [facility] introduces elicit patient portal. Now you can access parts of your medical record, email your doctor's office, and request medication refills online. 1. In your internet browser, go to [email or web address] 2. Click on the First Time User? Click Here link in the Sign In box. You will see the New Member Sign Up page. 3. Enter your Wallit Access Code exactly as it appears below. You will not need to use this code after youve completed the sign-up process. If you do not sign up before the expiration date, you must request a new code. · Wallit Access Code: [email or web address] Expires: 11/26/2017 11:49 AM 
 
4. Enter the last four digits of your Social Security Number (xxxx) and Date of Birth (mm/dd/yyyy) as indicated and click Submit. You will be taken to the next sign-up page. 5. Create a Wallit ID. This will be your Wallit login ID and cannot be changed, so think of one that is secure and easy to remember. 6. Create a Wallit password. You can change your password at any time. 7. Enter your Password Reset Question and Answer. This can be used at a later time if you forget your password. 8. Enter your e-mail address. You will receive e-mail notification when new information is available in [address] 9. Click Sign Up. You can now view and download portions of your medical record. 10. Click the Download Summary menu link to download a portable copy of your medical information. If you have questions, please visit the Frequently Asked Questions section of the Wallit website. Remember, Wallit is NOT to be used for urgent needs. For medical emergencies, dial 911. Now available from your iPhone and Android! Please provide this summary of care documentation to your next provider. Your primary care clinician is listed as Zafar Barboza. If you have any questions after today's visit, please call [phone].

## 2017-08-29 ENCOUNTER — HOSPITAL ENCOUNTER (OUTPATIENT)
Dept: PHYSICAL THERAPY | Age: 42
Discharge: HOME | End: 2017-08-29
Payer: MEDICAID

## 2017-08-29 PROCEDURE — 97140 MANUAL THERAPY 1/> REGIONS: CPT

## 2017-08-29 PROCEDURE — 97110 THERAPEUTIC EXERCISES: CPT

## 2017-08-29 NOTE — PROGRESS NOTES
[facility]  [address], [address] [facility]    OUTPATIENT physical Therapy DAILY Treatment NOTe  Visit: 2    NAME: David Carmona AGE: 35 y.o. GENDER: male  DATE: 8/29/2017  REFERRING PHYSICIAN: Darcy Mendiola MD        GOALS  Short term goals  Time frame: 3 weeks  1. Patient will be compliance and independent with the initial HEP as evidenced by being able to perform without cuing. 2. Patient will report a 50% improvement in symptoms. 3. Patient report a 50% improvement in sleeping. 4. Patient will tolerate 15 minutes of clinic activities before onset of symptoms. Long term goals  Time frame: 5 weeks  1. Patient will reports pain level decreased to 2/10 to allow increased ease of movement. 2. Patient will have an improved score on the TXU Kriss outcome measure by 8 points to demonstrate an increase in functional activity tolerance. 3. Patient will be independent in final individualized HEP. 4. Patient will sleep 6-8 hours without being interrupted by pain. SUBJECTIVE:   Pain today 5/10    OBJECTIVE DATA SUMMARY:     Pain:  Location:  Quality: aching and sharp  Now:8  Best:  Worst:  Factors that improve pain: heat, ice    Posture:   Rounded shoulders and head forward    Strength:   LE strength WNL    Range of Motion:   Trunk flexion/extension 50% of normal  SB     Spinal Assessment:   Mild low thoracic scolisosi      Joint Mobility:   Hypomobile lumbar spine with P-A mobs    Palpation:   TTP over lumbar sp processes, deep quadratus    Neurologic Assessment:   Tone: normal   Sensation: WNL   Reflexes: not tested    Special Tests:   - slump test  - SLR    Mobility:   Transitional Movements: WNL    Gait: WNL    Balance: WNL    Functional Measure: In compliance with CMSs Claims Based Outcome Reporting, the following G-code set was chosen for this patient based on their primary functional limitation being treated:     The outcome measure chosen to determine the severity of the functional limitation was the TXU Kriss Back pain with a score of 11/24 which was correlated with the impairment scale. ? Mobility - Walking and Moving Around:     - CURRENT STATUS: CK - 40%-59% impaired, limited or restricted    - GOAL STATUS: CI - 1%-19% impaired, limited or restricted    - D/C STATUS:  ---------------To be determined---------------        Physical Therapy Evaluation Charge Determination   History Examination Presentation Decision-Making   LOW Complexity : Zero comorbidities / personal factors that will impact the outcome / POC LOW Complexity : 1-2 Standardized tests and measures addressing body structure, function, activity limitation and / or participation in recreation  LOW Complexity : Stable, uncomplicated  LOW Complexity : FOTO score of       Based on the above components, the patient evaluation is determined to be of the following complexity level: LOW     TREATMENT/INTERVENTION:  Modalities (Rationale): MHP post treatment to decrease pain and muscle guarding      Therapeutic Exercises:  HEP: Prone on elbows, angry cat, child's pose, LTR, PPT, Figure 4 piriformis stretch, Segmental flexion/ extension over back of chair, Side bend stretch, Hamstring stretch. Additional clinic Activities:  Wall slides with PPT    Supine:   BKTC with yellow physioball  LTR with yellow ball  Bridges on yellow ball  Single leg lumbar rotation stretch    LBE/UBE x 5 minutes resistance4      Manual Therapy:  P-A mob L1-L5 in prone'  STM B/L lumbar paraspinals    Neuro Re-Education:  Discussed posture, pt with significant rounded shoulders and head forward posture in sitting. Discussed using lumbar support for sitting. Balance Training:  none    Ambulation/Gait Training:  none    Activity tolerance and post treatment pain report: Tolerated well    Education:  Education was provided to the patient on the following topics: Continue exercises.   [x] No changes were made to the home exercise program.  []    The following changes were made to the home exercise program:   Patient verbalized understanding of the topics presented. ASSESSMENT:   Pt returns today following IE. He reports decrease in low back pain. He showed good recall of his exercises. Advanced Clinic activities as above. Patients progression toward goals is as follows:  [x]     Improving appropriately and progressing toward goals  []     Improving slowly and progressing toward goals  []     Not making progress toward goals and plan of care will be adjusted    PLAN OF CARE:   Patient continues to benefit from skilled intervention to address the above impairments. [x]    Continue treatment per established plan of care. []     Recommend the following changes to the plan of care:     Recommendations/Intent for next treatment:Progress Core strengthening, Clinic activities.     Maine Abraham PT     Patient Time in clinic:

## 2017-09-26 ENCOUNTER — OFFICE VISIT (OUTPATIENT)
Dept: BEHAVIORAL/MENTAL HEALTH CLINIC | Age: 42
End: 2017-09-26

## 2017-09-26 VITALS
HEART RATE: 80 BPM | HEIGHT: 67 IN | OXYGEN SATURATION: 97 % | DIASTOLIC BLOOD PRESSURE: 76 MMHG | SYSTOLIC BLOOD PRESSURE: 118 MMHG | WEIGHT: 148 LBS | BODY MASS INDEX: 23.23 KG/M2

## 2017-09-26 DIAGNOSIS — F39 MOOD DISORDER (HCC): ICD-10-CM

## 2017-09-26 DIAGNOSIS — F39 SEVERE MOOD DISORDER WITH PSYCHOTIC FEATURES (HCC): ICD-10-CM

## 2017-09-26 DIAGNOSIS — F40.10 SOCIAL ANXIETY DISORDER: ICD-10-CM

## 2017-09-26 RX ORDER — RISPERIDONE 1 MG/1
1 TABLET, FILM COATED ORAL
Qty: 30 TAB | Refills: 5 | Status: SHIPPED | OUTPATIENT
Start: 2017-09-26 | End: 2019-07-02

## 2017-09-26 RX ORDER — HYDROXYZINE PAMOATE 25 MG/1
25 CAPSULE ORAL
Qty: 90 CAP | Refills: 0 | Status: SHIPPED | OUTPATIENT
Start: 2017-09-26 | End: 2017-11-02 | Stop reason: SDUPTHER

## 2017-09-26 RX ORDER — PAROXETINE HYDROCHLORIDE 20 MG/1
TABLET, FILM COATED ORAL
Qty: 30 TAB | Refills: 5 | Status: SHIPPED | OUTPATIENT
Start: 2017-09-26

## 2017-09-26 NOTE — PROGRESS NOTES
Psychiatric Outpatient Progress Note    Account Number:  [id number]  Name: Josh Chu    SUBJECTIVE:   CHIEF COMPLAINT:  Josh Chu is a 38 y.o. male and was seen today for follow-up of psychiatric condition and psychotropic medication management. HPI:    Severo Crew reports the following psychiatric symptoms:  depression, psychotic behavior and anxiety. Client reported h/o mood disorder, depression and panic attacks for a long time. Had tial of risperidone, abilify, fluoxetine, bupropion, alprazolam from Dr Landry Hinds, Dr. Arcelia Lawrence. Reported had benefits with Risperidone. The symptoms have been present for many years and are of moderate to high severity. The symptoms occur daily.     Pt reported sleeping for 8 hrs and reported improved sleep. Reported improved mood, denied any agitated, improvement in anger,  Improvement in racing thoughts and feels calm and denied any HI. Reported appetite is good, has interest and able to focus and concentrate. Last episode 2 days ago. Denied any HI towards any specific person. Denied hopelessness or helplessness or passive suicide thoughts. Denied any symptoms of  Psychosis. Denied any auditory hallucination to kill people. Reported improvement in anxiety. Contributing factors include: unemployed, financial issues, relationship issues with familyPatient denies SI/HI/SIB. Side Effects:  none      Fam/Soc Hx (from [place] with updates):    Family History   Problem Relation Age of Onset    No Known Problems Mother     No Known Problems Father       Social History   Substance Use Topics    Smoking status: Current Every Day Smoker     Packs/day: 0.25    Smokeless tobacco: Never Used    Alcohol use No       REVIEW OF SYSTEMS:  Psychiatric:  depression, mood disorder, BoPD, agitation.   Appetite:improved   Sleep: improved                    Mental Status exam: WNL except for      Sensorium  oriented to time, place and person   Relations cooperative    Eye Contact appropriate   Appearance:  age appropriate, casually dressed and within normal Limits   Motor Behavior/Gait:  gait stable and within normal limits   Speech:  normal pitch and normal volume   Thought Process: goal directed, logical and within normal limits   Thought Content free of delusions and free of hallucinations   Suicidal ideations no plan , no intention and none   Homicidal ideations no plan , no intention and none   Mood:  euthymic   Affect:  euthymic and mood-congruent   Memory recent  adequate   Memory remote:  adequate   Concentration:  adequate   Abstraction:  abstract   Insight:  fair   Reliability fair   Judgment:  fair       MEDICAL DECISION MAKING  Data: pertinent labs, imaging, medical records and diagnostic tests reviewed and incorporated in diagnosis and treatment plan    Allergies   Allergen Reactions    Ibuprofen Itching    Pcn [Penicillins] Itching        Current Outpatient Prescriptions   Medication Sig Dispense Refill    PARoxetine (PAXIL) 20 mg tablet Please take half tab,let daily x 5 days and then take 1 daily 30 Tab 5    risperiDONE (RISPERDAL) 1 mg tablet Take 1 Tab by mouth nightly. 30 Tab 5    hydrOXYzine pamoate (VISTARIL) 25 mg capsule Take 1 Cap by mouth three (3) times daily as needed for Anxiety. 90 Cap 0    aspirin delayed-release 81 mg tablet Take 81 mg by mouth daily.   0    lisinopril (PRINIVIL, ZESTRIL) 20 mg tablet take 1 tablet by mouth once daily for high blood pressure 30 Tab 0    atorvastatin (LIPITOR) 40 mg tablet take 1 tablet by mouth once daily for cholesterol 30 Tab 0    atenolol (TENORMIN) 25 mg tablet take 1 tablet by mouth once daily for high blood pressure 30 Tab 0    omeprazole (PRILOSEC) 40 mg capsule take 1 capsule by mouth once daily 30 Cap 0    VENTOLIN HFA 90 mcg/actuation inhaler inhale 2 puffs by mouth every 6 hours if needed for BREATH OR WHEEZING  0    acetaminophen (TYLENOL ARTHRITIS PAIN) 650 mg CR tablet Take 1 Tab by mouth three (3) times daily as needed for Pain. Indications: ARTHRITIC PAIN 100 Tab 3    ipratropium (ATROVENT HFA) 17 mcg/actuation inhaler Take 2 puffs TWICE DAILY, up to four times daily for shortness of breath and wheezing. Indications: BRONCHOSPASM PREVENTION WITH COPD 1 Inhaler 5    guaiFENesin SR (MUCINEX) 600 mg SR tablet Take 1 Tab by mouth two (2) times daily as needed for Congestion. 30 Tab 2    ergocalciferol (ERGOCALCIFEROL) 50,000 unit capsule Take 1 Cap by mouth every seven (7) days. Indications: VITAMIN D DEFICIENCY 12 Cap 3    oxyCODONE IR (ROXICODONE) 5 mg immediate release tablet take 1 tablet by mouth every 4 hours if needed for pain  0        Visit Vitals    /76 (BP 1 Location: Left arm, BP Patient Position: Sitting)    Pulse 80    Ht 5' 7\" (1.702 m)    Wt 67.1 kg (148 lb)    SpO2 97%    BMI 23.18 kg/m2         Problems addressed today:  Mood disorder, social anxiety ds, heavy caffeine use, R/o Borderline personality disorder, R/o antisocial personality ds. R/o major depression severe with psychotic features    Assessment:   Denisha Bartlett  is a 38 y.o. AA male  is responding to treatment. Medical H/o HT. Symptoms are occurring rarely. He presents with mood disorder , anxiety with ? BoPD. Client reported \" I am feeling good with my medications\". Reported improvement in mood, anxiety, sleep , appetite, racing thoughts, homicidal ideations. Denied any anger issues and agitation. Denied any AH. Reported taking hydroxyzine once daily and is benefiting. Reported relationship with family has improved. Denied any SI or AVH at this time. Plan to continue risperidone and paroxetine  to target mood, AH and paranoia. Reviewed labs and records. Patient denies SI/HI/SIB. No evidence of AH/VH or delusions Reviewed labs. Client is responding to treatment and is tolerating treatment well. Psychoeducation, medication teaching, co-morbid illness and pertinent health factors to manage care were discussed. Overall, patient is stable at this time but will require ongoing medication management. Risk Scoring- chronic illnesses and prescription drug management    Treatment Plan:  1. Medications:          Medication Changes/Adjustments: Continue Paroxetine  20 mg daily                                                                Contniue Risperidone 0.5 mg for 5 days and then take 1 mg HS                                                                 Continue Hydroxyzine 25 mg TID prn       Current Outpatient Prescriptions   Medication Sig Dispense Refill    PARoxetine (PAXIL) 20 mg tablet Please take half tab,let daily x 5 days and then take 1 daily 30 Tab 5    risperiDONE (RISPERDAL) 1 mg tablet Take 1 Tab by mouth nightly. 30 Tab 5    hydrOXYzine pamoate (VISTARIL) 25 mg capsule Take 1 Cap by mouth three (3) times daily as needed for Anxiety. 90 Cap 0    aspirin delayed-release 81 mg tablet Take 81 mg by mouth daily. 0    lisinopril (PRINIVIL, ZESTRIL) 20 mg tablet take 1 tablet by mouth once daily for high blood pressure 30 Tab 0    atorvastatin (LIPITOR) 40 mg tablet take 1 tablet by mouth once daily for cholesterol 30 Tab 0    atenolol (TENORMIN) 25 mg tablet take 1 tablet by mouth once daily for high blood pressure 30 Tab 0    omeprazole (PRILOSEC) 40 mg capsule take 1 capsule by mouth once daily 30 Cap 0    VENTOLIN HFA 90 mcg/actuation inhaler inhale 2 puffs by mouth every 6 hours if needed for BREATH OR WHEEZING  0    acetaminophen (TYLENOL ARTHRITIS PAIN) 650 mg CR tablet Take 1 Tab by mouth three (3) times daily as needed for Pain. Indications: ARTHRITIC PAIN 100 Tab 3    ipratropium (ATROVENT HFA) 17 mcg/actuation inhaler Take 2 puffs TWICE DAILY, up to four times daily for shortness of breath and wheezing. Indications: BRONCHOSPASM PREVENTION WITH COPD 1 Inhaler 5    guaiFENesin SR (MUCINEX) 600 mg SR tablet Take 1 Tab by mouth two (2) times daily as needed for Congestion.  30 Tab 2    ergocalciferol (ERGOCALCIFEROL) 50,000 unit capsule Take 1 Cap by mouth every seven (7) days. Indications: VITAMIN D DEFICIENCY 12 Cap 3    oxyCODONE IR (ROXICODONE) 5 mg immediate release tablet take 1 tablet by mouth every 4 hours if needed for pain  0                  The following regarding medications was addressed:    (The risks and benefits of the proposed medication; the potential medication side effects ie    dry mouth, weight gain, GI upset, headache; patient given opportunity to ask questions)       2. Counseling and coordination of care including instructions for treatment, risks/benefits, risk factor reduction and patient/family education. He agrees with the plan. Patient instructed to call with any side effects, questions or issues. Instructed patient to call the clinic, and if after hours call the provider on call ifclient experiences any suicidal thought or ideas to hurt self or other. Also instructed to call 911 or go to the ED. Patient verbalized understanding and agreed to call    3. Follow-up Disposition:  Return in about 6 months (around 3/26/2018) for med check and follow up. 4. Other: Nutritional/health counseling on diet and exercise. For reliable dietary information, go to www. [email or web address].org. PSYCHOTHERAPY:  approx 16 minutes  Type:  Supportive/Cognitive Behavioral psychotherapy provided  Focus:     Current problems   Housing issues- lives with cousin and looking for a new place   Occupational issues- unemployed and looking for a job   Medical issues-HT   Interpersonal conflicts  Psychoeducation provided  Treatment plan reviewed with patient-including diagnosis and medications    Worked on issues of denial & effects of substance dependency/use    Delories Slight is progressing.     Michelle Maldonado NP  9/26/2017

## 2017-09-26 NOTE — MR AVS SNAPSHOT
Visit Information Date & Time Provider Department Dept. Phone Encounter #  
 9/26/2017 10:30 AM Jose G Elena, [address] [facility] [phone] [id number] Follow-up Instructions Return in about 6 months (around 3/26/2018) for med check and follow up. Upcoming Health Maintenance Date Due INFLUENZA AGE 9 TO ADULT 8/1/2017 DTaP/Tdap/Td series (2 - Td) 10/10/2026 Allergies as of 9/26/2017  Review Complete On: 9/26/2017 By: Jose G Elena NP Severity Noted Reaction Type Reactions Ibuprofen  07/26/2014    Itching Pcn [Penicillins]  07/26/2014    Itching Current Immunizations  Reviewed on 2/20/2015 Name Date Influenza Vaccine 11/12/2013 Influenza Vaccine Split 10/31/2012 TB Skin Test (PPD) Intradermal 1/23/2015 Not reviewed this visit You Were Diagnosed With   
  
 Codes Comments Mood disorder ([facility]     ICD-10-CM: F39 
ICD-9-CM: 296.90 Severe mood disorder with psychotic features ([facility]     ICD-10-CM: F39 
ICD-9-CM: 296.99 Social anxiety disorder     ICD-10-CM: F40.10 ICD-9-CM: 300.23 Vitals BP Pulse Height(growth percentile) Weight(growth percentile) SpO2 BMI  
 118/76 (BP 1 Location: Left arm, BP Patient Position: Sitting) 80 5' 7\" (1.702 m) 148 lb (67.1 kg) 97% 23.18 kg/m2 Smoking Status Current Every Day Smoker Vitals History BMI and BSA Data Body Mass Index Body Surface Area  
 23.18 kg/m 2 1.78 m 2 Preferred Pharmacy Pharmacy Name Phone [id number] [facility], [facility] Your Updated Medication List  
  
   
This list is accurate as of: 9/26/17 11:06 AM.  Always use your most recent med list.  
  
  
  
  
 acetaminophen 650 mg CR tablet Commonly known as:  TYLENOL ARTHRITIS PAIN Take 1 Tab by mouth three (3) times daily as needed for Pain.  Indications: ARTHRITIC PAIN  
  
 aspirin delayed-release 81 mg tablet Take 81 mg by mouth daily. atenolol 25 mg tablet Commonly known as:  TENORMIN  
take 1 tablet by mouth once daily for high blood pressure  
  
 atorvastatin 40 mg tablet Commonly known as:  LIPITOR  
take 1 tablet by mouth once daily for cholesterol  
  
 ergocalciferol 50,000 unit capsule Commonly known as:  ERGOCALCIFEROL Take 1 Cap by mouth every seven (7) days. Indications: VITAMIN D DEFICIENCY  
  
 guaiFENesin  mg SR tablet Commonly known as:  [employer] Take 1 Tab by mouth two (2) times daily as needed for Congestion. hydrOXYzine pamoate 25 mg capsule Commonly known as:  VISTARIL Take 1 Cap by mouth three (3) times daily as needed for Anxiety. ipratropium 17 mcg/actuation inhaler Commonly known as:  ATROVENT HFA Take 2 puffs TWICE DAILY, up to four times daily for shortness of breath and wheezing. Indications: BRONCHOSPASM PREVENTION WITH COPD  
  
 lisinopril 20 mg tablet Commonly known as:  Velora Matthew  
take 1 tablet by mouth once daily for high blood pressure  
  
 omeprazole 40 mg capsule Commonly known as:  PRILOSEC  
take 1 capsule by mouth once daily  
  
 oxyCODONE IR 5 mg immediate release tablet Commonly known as:  ROXICODONE  
take 1 tablet by mouth every 4 hours if needed for pain PARoxetine 20 mg tablet Commonly known as:  PAXIL Please take half tab,let daily x 5 days and then take 1 daily  
  
 risperiDONE 1 mg tablet Commonly known as:  RisperDAL Take 1 Tab by mouth nightly. VENTOLIN HFA 90 mcg/actuation inhaler Generic drug:  albuterol  
inhale 2 puffs by mouth every 6 hours if needed for BREATH OR WHEEZING Prescriptions Sent to Pharmacy Refills PARoxetine (PAXIL) 20 mg tablet 5 Sig: Please take half tab,let daily x 5 days and then take 1 daily  Class: Normal  
 Pharmacy: [id number], [address] [place] Ph #: [phone]  
 risperiDONE (RISPERDAL) 1 mg tablet 5 Sig: Take 1 Tab by mouth nightly. Class: Normal  
 Pharmacy: [address] Ph #: [phone] Route: Oral  
 hydrOXYzine pamoate (VISTARIL) 25 mg capsule 0 Sig: Take 1 Cap by mouth three (3) times daily as needed for Anxiety. Class: Normal  
 Pharmacy: [address] Ph #: [phone] Route: Oral  
  
Follow-up Instructions Return in about 6 months (around 3/26/2018) for med check and follow up. To-Do List   
 09/27/2017 10:30 AM  
  Appointment with Leonor Cespedes PT at [address] [place] [address] ([phone]) Please remember to arrive at the hospital at least 30 minutes prior to your scheduled appointment time. When you come in for your appointment, please be sure to bring the therapy prescription the doctor gave you, your insurance card, and a list of the medicines you are taking. Also, please remember to wear comfortable, loose- fitting clothes. We look forward to seeing you. Patient Instructions For reliable dietary information, go to www. EATRIGHT.org. Sleep Tips What to avoid   
· Do not have drinks with caffeine, such as coffee or black tea, for 8 hours before bed. · Do not smoke or use other types of tobacco near bedtime. Nicotine is a stimulant and can keep you awake. · Avoid drinking alcohol late in the evening, because it can cause you to wake in the middle of the night. · Do not eat a big meal close to bedtime. If you are hungry, eat a light snack. · Do not drink a lot of water close to bedtime, because the need to urinate may wake you up during the night. · Do not read or watch TV in bed. Use the bed only for sleeping and sexual activity. What to try   
· Go to bed at the same time every night, and wake up at the same time every morning. Do not take naps during the day. · Keep your bedroom quiet, dark, and cool. · Get regular exercise, but not within 3 to 4 hours of your bedtime. · Sleep on a comfortable pillow and mattress. · If watching the clock makes you anxious, turn it facing away from you so you cannot see the time. · If you worry when you lie down, start a worry book. Well before bedtime, write down your worries, and then set the book and your concerns aside. · Try meditation or other relaxation techniques before you go to bed. · If you cannot fall asleep, get up and go to another room until you feel sleepy. Do something relaxing. Repeat your bedtime routine before you go to bed again. Make your house quiet and calm about an hour before bedtime. Turn down the lights, turn off the TV, log off the computer, and turn down the volume on music. This can help you relax after a busy day. Learning About Mood Disorders What are mood disorders? Mood disorders are medical problems that affect how you feel. They can impact your moods, thoughts, and actions. Mood disorders include: · Depression. This causes you to feel sad or hopeless for much of the time. · Bipolar disorder. This causes extreme mood changes from manic episodes of very high energy to extreme lows of depression. · Seasonal affective disorder (SAD). This is a type of depression that affects you during the same season each year. Most often people experience SAD during the fall and winter months when days are shorter and there is less light. What are the symptoms? Depression You may: · Feel sad or hopeless nearly every day. · Lose interest in or not get pleasure from most daily activities. You feel this way nearly every day. · Have low energy, changes in your appetite, or changes in how well you sleep. · Have trouble concentrating. · Think about death and suicide.  Keep the numbers for these national suicide hotlines: 3-645-550-TALK (9-798.709.4541) and 8-947-JFYIBYZ (5-584.315.9956). If you or someone you know talks about suicide or feeling hopeless, get help right away. Bipolar disorder Symptoms depend on your mood swings. You may: · Feel very happy, energetic, or on edge. · Feel like you need very little sleep. · Feel overly self-confident. · Do impulsive things, such as spending a lot of money. · Feel sad or hopeless. · Have racing thoughts or trouble thinking and making decisions. · Lose interest in things you have enjoyed in the past. 
· Think about death and suicide. Keep the numbers for these national suicide hotlines: 4-712-389-TALK (8-355.990.2046) and 3-024-UKLPRIW (8-204.897.3439). If you or someone you know talks about suicide or feeling hopeless, get help right away. Seasonal affective disorder (SAD) Symptoms come and go at about the same time each year. For most people with SAD, symptoms come during the winter when there is less daylight. You may: · Feel sad, grumpy, cameron, or anxious. · Lose interest in your usual activities. · Eat more and crave carbohydrates, such as bread and pasta. · Gain weight. · Sleep more and feel drowsy during the daytime. How are mood disorders treated? Mood disorders can be treated with medicines or counseling, or a combination of both. Medicines for depression and SAD may include antidepressants. Medicines for bipolar disorder may include: · Mood stabilizers. · Antipsychotics. · Benzodiazepines. Counseling may involve cognitive-behavioral therapy. It teaches you how to change the ways you think and behave. This can help you stop thinking bad thoughts about yourself and your life. Light therapy is the main treatment for SAD. This therapy uses a special kind of lamp. You let the lamp shine on you at certain times, usually in the morning. This may help your symptoms during the months when there is less sunlight. Healthy lifestyle Healthy lifestyle changes may help you feel better. · Get at least 30 minutes of exercise on most days of the week. Walking is a good choice. · Eat a healthy diet. Include fruits, vegetables, lean proteins, and whole grains in your diet each day. · Keep a regular sleep schedule. Try for 8 hours of sleep a night. · Find ways to manage stress, such as relaxation exercises. · Avoid alcohol and illegal drugs. Follow-up care is a key part of your treatment and safety. Be sure to make and go to all appointments, and call your doctor if you are having problems. It's also a good idea to know your test results and keep a list of the medicines you take. Where can you learn more? Go to http://sonia-brenda.info/. Enter B572 in the search box to learn more about \"Learning About Mood Disorders. \" Current as of: May 3, 2017 Content Version: 11.3 © 5205-9225 [employer]. Care instructions adapted under license by [employer] (which disclaims liability or warranty for this information). If you have questions about a medical condition or this instruction, always ask your healthcare professional. [address] any warranty or liability for your use of this information. Introducing [facility]! Frances Herrmann introduces goOutMap patient portal. Now you can access parts of your medical record, email your doctor's office, and request medication refills online. 1. In your internet browser, go to [email or web address] 2. Click on the First Time User? Click Here link in the Sign In box. You will see the New Member Sign Up page. 3. Enter your goOutMap Access Code exactly as it appears below. You will not need to use this code after youve completed the sign-up process. If you do not sign up before the expiration date, you must request a new code. · goOutMap Access Code: [email or web address] Expires: 11/26/2017 11:49 AM 
 
 4. Enter the last four digits of your Social Security Number (xxxx) and Date of Birth (mm/dd/yyyy) as indicated and click Submit. You will be taken to the next sign-up page. 5. Create a Solutionreach ID. This will be your Solutionreach login ID and cannot be changed, so think of one that is secure and easy to remember. 6. Create a Solutionreach password. You can change your password at any time. 7. Enter your Password Reset Question and Answer. This can be used at a later time if you forget your password. 8. Enter your e-mail address. You will receive e-mail notification when new information is available in [address] 9. Click Sign Up. You can now view and download portions of your medical record. 10. Click the Download Summary menu link to download a portable copy of your medical information. If you have questions, please visit the Frequently Asked Questions section of the Solutionreach website. Remember, Solutionreach is NOT to be used for urgent needs. For medical emergencies, dial 911. Now available from your iPhone and Android! Please provide this summary of care documentation to your next provider. Your primary care clinician is listed as Eva Grajeda. If you have any questions after today's visit, please call [phone].

## 2017-09-26 NOTE — PATIENT INSTRUCTIONS
For reliable dietary information, go to www. EATRIGHT.org. Sleep Tips    What to avoid    · Do not have drinks with caffeine, such as coffee or black tea, for 8 hours before bed. · Do not smoke or use other types of tobacco near bedtime. Nicotine is a stimulant and can keep you awake. · Avoid drinking alcohol late in the evening, because it can cause you to wake in the middle of the night. · Do not eat a big meal close to bedtime. If you are hungry, eat a light snack. · Do not drink a lot of water close to bedtime, because the need to urinate may wake you up during the night. · Do not read or watch TV in bed. Use the bed only for sleeping and sexual activity. What to try    · Go to bed at the same time every night, and wake up at the same time every morning. Do not take naps during the day. · Keep your bedroom quiet, dark, and cool. · Get regular exercise, but not within 3 to 4 hours of your bedtime. · Sleep on a comfortable pillow and mattress. · If watching the clock makes you anxious, turn it facing away from you so you cannot see the time. · If you worry when you lie down, start a worry book. Well before bedtime, write down your worries, and then set the book and your concerns aside. · Try meditation or other relaxation techniques before you go to bed. · If you cannot fall asleep, get up and go to another room until you feel sleepy. Do something relaxing. Repeat your bedtime routine before you go to bed again. Make your house quiet and calm about an hour before bedtime. Turn down the lights, turn off the TV, log off the computer, and turn down the volume on music. This can help you relax after a busy day. Learning About Mood Disorders  What are mood disorders? Mood disorders are medical problems that affect how you feel. They can impact your moods, thoughts, and actions. Mood disorders include:  · Depression. This causes you to feel sad or hopeless for much of the time. · Bipolar disorder. This causes extreme mood changes from manic episodes of very high energy to extreme lows of depression. · Seasonal affective disorder (SAD). This is a type of depression that affects you during the same season each year. Most often people experience SAD during the fall and winter months when days are shorter and there is less light. What are the symptoms? Depression  You may:  · Feel sad or hopeless nearly every day. · Lose interest in or not get pleasure from most daily activities. You feel this way nearly every day. · Have low energy, changes in your appetite, or changes in how well you sleep. · Have trouble concentrating. · Think about death and suicide. Keep the numbers for these national suicide hotlines: 6-395-998-TALK (2-238.865.8642) and 2-235-WXHBFVM (1-840.631.3489). If you or someone you know talks about suicide or feeling hopeless, get help right away. Bipolar disorder  Symptoms depend on your mood swings. You may:  · Feel very happy, energetic, or on edge. · Feel like you need very little sleep. · Feel overly self-confident. · Do impulsive things, such as spending a lot of money. · Feel sad or hopeless. · Have racing thoughts or trouble thinking and making decisions. · Lose interest in things you have enjoyed in the past.  · Think about death and suicide. Keep the numbers for these national suicide hotlines: 8-922-846-TALK (6-196.176.1015) and 2-205-AZBXJGX (5-325.836.5493). If you or someone you know talks about suicide or feeling hopeless, get help right away. Seasonal affective disorder (SAD)  Symptoms come and go at about the same time each year. For most people with SAD, symptoms come during the winter when there is less daylight. You may:  · Feel sad, grumpy, cameron, or anxious. · Lose interest in your usual activities. · Eat more and crave carbohydrates, such as bread and pasta. · Gain weight. · Sleep more and feel drowsy during the daytime.   How are mood disorders treated? Mood disorders can be treated with medicines or counseling, or a combination of both. Medicines for depression and SAD may include antidepressants. Medicines for bipolar disorder may include:  · Mood stabilizers. · Antipsychotics. · Benzodiazepines. Counseling may involve cognitive-behavioral therapy. It teaches you how to change the ways you think and behave. This can help you stop thinking bad thoughts about yourself and your life. Light therapy is the main treatment for SAD. This therapy uses a special kind of lamp. You let the lamp shine on you at certain times, usually in the morning. This may help your symptoms during the months when there is less sunlight. Healthy lifestyle  Healthy lifestyle changes may help you feel better. · Get at least 30 minutes of exercise on most days of the week. Walking is a good choice. · Eat a healthy diet. Include fruits, vegetables, lean proteins, and whole grains in your diet each day. · Keep a regular sleep schedule. Try for 8 hours of sleep a night. · Find ways to manage stress, such as relaxation exercises. · Avoid alcohol and illegal drugs. Follow-up care is a key part of your treatment and safety. Be sure to make and go to all appointments, and call your doctor if you are having problems. It's also a good idea to know your test results and keep a list of the medicines you take. Where can you learn more? Go to http://sonia-brenda.info/. Enter D866 in the search box to learn more about \"Learning About Mood Disorders. \"  Current as of: May 3, 2017  Content Version: 11.3  © 5721-2958 [employer]. Care instructions adapted under license by [employer] (which disclaims liability or warranty for this information).  If you have questions about a medical condition or this instruction, always ask your healthcare professional. [place] [address] any warranty or liability for your use of this information.

## 2017-11-02 DIAGNOSIS — F40.10 SOCIAL ANXIETY DISORDER: ICD-10-CM

## 2017-11-02 RX ORDER — HYDROXYZINE PAMOATE 25 MG/1
CAPSULE ORAL
Qty: 90 CAP | Refills: 0 | Status: SHIPPED | OUTPATIENT
Start: 2017-11-02

## 2017-11-20 ENCOUNTER — DOCUMENTATION ONLY (OUTPATIENT)
Dept: BEHAVIORAL/MENTAL HEALTH CLINIC | Age: 42
End: 2017-11-20

## 2017-12-06 NOTE — PROGRESS NOTES
[facility]  [address], [address] [facility]    OUTPATIENT physical Therapy discharge note      12/6/2017:  Patient will be discharged from physical therapy at this time. Criteria for termination of care:    []           Patient has plateaued  [x]           Patient has not returned to therapy  []           Patient has missed three or more visits without prior notification  []           Other    Patient was seen for 2 visits from 8/22/17 to 8/29/17. Please refer to the most recent progress note for the latest PT info available. If you need anything further faxed to you, please contact us at [phone].     Thank you for this referral.  Willie Garcia, PT

## 2017-12-11 DIAGNOSIS — F40.10 SOCIAL ANXIETY DISORDER: ICD-10-CM

## 2017-12-11 RX ORDER — HYDROXYZINE PAMOATE 25 MG/1
CAPSULE ORAL
Qty: 90 CAP | Refills: 0 | OUTPATIENT
Start: 2017-12-11

## 2018-08-13 ENCOUNTER — HOSPITAL ENCOUNTER (OUTPATIENT)
Dept: PHYSICAL THERAPY | Age: 43
Discharge: HOME | End: 2018-08-13
Payer: MEDICAID

## 2018-08-13 PROCEDURE — 97110 THERAPEUTIC EXERCISES: CPT

## 2018-08-13 PROCEDURE — 97161 PT EVAL LOW COMPLEX 20 MIN: CPT
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
## 2018-08-15 ENCOUNTER — HOSPITAL ENCOUNTER (OUTPATIENT)
Dept: PHYSICAL THERAPY | Age: 43
Discharge: HOME | End: 2018-08-15
Payer: MEDICAID

## 2018-08-15 PROCEDURE — 97110 THERAPEUTIC EXERCISES: CPT

## 2018-08-15 NOTE — PROGRESS NOTES
[facility]  [address], [address] [facility]    OUTPATIENT physical Therapy DAILY Treatment NOTe  Visit: 2    NAME: Franchesca Pretty AGE: 38 y.o. GENDER: male  DATE: 8/15/2018  REFERRING PHYSICIAN: Edith Stoner MD        GOALS  Short term goals  Time frame: 3 weeks  1. Patient will be compliance and independent with the initial HEP as evidenced by being able to perform without cuing. 2. Patient will report a 50% improvement in symptoms. 3. Patient report a 50% improvement in sleeping. 4. Patient will tolerate 20 minutes of clinic activities before onset of symptoms. Long term goals  Time frame: 6 weeks  1. Patient will reports pain level decreased to 2/10 to allow increased ease of movement. 2. Patient will have an improved score on the NDI outcome measure by 20 points to demonstrate an increase in functional activity tolerance. 3. Patient will be independent in final individualized HEP. 4. Patient will return to work without being limited by symptoms. 5. Patient will sleep 6-8 hours without being interrupted by pain. SUBJECTIVE:   Pt reports 7/10 pain in back and neck. Pain In: 7/10    OBJECTIVE DATA SUMMARY:     Pain:  Location:  Quality: aching  Now: Neck  8/10  Back 8/10  Best:  Worst:  Factors that improve pain: rest lying down    Posture:   Head forward and rounded shoulders    Strength:   UE strength WNL    Range of Motion:   Cx ROM   75% of normal flexion/ext and b/L rotation    Spinal Assessment:   Flattened lumbar spine      Joint Mobility:   Cx hypomobility with distraction and side glide    Palpation:   Mild TTP Cx paraspinals and lumbar paraspinals    Neurologic Assessment:   Tone: normal   Sensation: WNL   Reflexes: not tested    Special Tests:   - Spurlings  -SLR    Mobility:   Transitional Movements: WFL    Gait: WFL    Balance: WNL    Functional Measure:      In compliance with CMSs Claims Based Outcome Reporting, the following G-code set was chosen for this patient based on their primary functional limitation being treated: The outcome measure chosen to determine the severity of the functional limitation was the NDI with a score of 25/50 which was correlated with the impairment scale. ? Mobility - Walking and Moving Around:     - CURRENT STATUS: CK - 40%-59% impaired, limited or restricted    - GOAL STATUS: CI - 1%-19% impaired, limited or restricted    - D/C STATUS:  ---------------To be determined---------------      Physical Therapy Evaluation Charge Determination   History Examination Presentation Decision-Making   LOW Complexity : Zero comorbidities / personal factors that will impact the outcome / POC LOW Complexity : 1-2 Standardized tests and measures addressing body structure, function, activity limitation and / or participation in recreation  LOW Complexity : Stable, uncomplicated  LOW Complexity : FOTO score of       Based on the above components, the patient evaluation is determined to be of the following complexity level: LOW     TREATMENT/INTERVENTION:  Modalities (Rationale): MHP post treatment to decrease pain and muscle guarding      Therapeutic Exercises:  HEP LTR, KTC, Bridges, Chin tucks (supine), UT stretch (B/L)  Shoulder blade squeeze    Clinic Activities    Flexion stretch over blue ball  Flexion/ext over back of chair  Side Bend stretch      Supine  Chin Tuck 5 reps with 20 sec hold  Bridges x 15 reps  LTR x 15  Single leg lumbar stretch 3 reps with 30 sec hold B/L  BKTC x 15    All 4's  Alt UE and Alt LE  10 reps  Child's pose 3 reps with 20 sec hold. Manual Therapy:  Cx Distraction Grade 2    Neuro Re-Education:  Discussed importance of sitting posture, supported lumbar    Balance Training:  none    Ambulation/Gait Training:  none    Activity tolerance and post treatment pain report:    Tolerated all exercises well with no report of pain  Pain Out:     Education:  Education was provided to the patient on the following topics: Continue exercises. [x]    No changes were made to the home exercise program.  []    The following changes were made to the home exercise program:   Patient verbalized understanding of the topics presented. ASSESSMENT:   Pt returns today following IE. He reports 7/10 neck and back pain. Reviewed HEP with VC's required for form, advanced clinic activities as above with no report of pain. Will continue to advance exercises and overall fitness. Patients progression toward goals is as follows:  []     Improving appropriately and progressing toward goals  []     Improving slowly and progressing toward goals  []     Not making progress toward goals and plan of care will be adjusted    PLAN OF CARE:   Patient continues to benefit from skilled intervention to address the above impairments. [x]    Continue treatment per established plan of care.   []     Recommend the following changes to the plan of care:     Recommendations/Intent for next treatment: Advance as able, increase clinic exercise time    Anna Marie Roper PT     Patient Time in clinic:

## 2018-08-21 ENCOUNTER — HOSPITAL ENCOUNTER (OUTPATIENT)
Dept: PHYSICAL THERAPY | Age: 43
Discharge: HOME | End: 2018-08-21
Payer: MEDICAID

## 2018-08-21 PROCEDURE — 97110 THERAPEUTIC EXERCISES: CPT

## 2018-08-21 NOTE — PROGRESS NOTES
[facility]  [address], [address] [facility]    OUTPATIENT physical Therapy DAILY Treatment NOTe  Visit: 3    NAME: Kelly Carlton AGE: 38 y.o. GENDER: male  DATE: 8/21/2018  REFERRING PHYSICIAN: Peter Beltre MD        GOALS  Short term goals  Time frame: 3 weeks  1. Patient will be compliance and independent with the initial HEP as evidenced by being able to perform without cuing. 2. Patient will report a 50% improvement in symptoms. 3. Patient report a 50% improvement in sleeping. 4. Patient will tolerate 20 minutes of clinic activities before onset of symptoms. Long term goals  Time frame: 6 weeks  1. Patient will reports pain level decreased to 2/10 to allow increased ease of movement. 2. Patient will have an improved score on the NDI outcome measure by 20 points to demonstrate an increase in functional activity tolerance. 3. Patient will be independent in final individualized HEP. 4. Patient will return to work without being limited by symptoms. 5. Patient will sleep 6-8 hours without being interrupted by pain. SUBJECTIVE:   Pt reports 7/10 pain in back and neck, it hurts in the morning    Pain In: 7/10    OBJECTIVE DATA SUMMARY:     Pain:  Location:  Quality: aching  Now: Neck  8/10  Back 8/10  Best:  Worst:  Factors that improve pain: rest lying down    Posture:   Head forward and rounded shoulders    Strength:   UE strength WNL    Range of Motion:   Cx ROM   75% of normal flexion/ext and b/L rotation    Spinal Assessment:   Flattened lumbar spine      Joint Mobility:   Cx hypomobility with distraction and side glide    Palpation:   Mild TTP Cx paraspinals and lumbar paraspinals    Neurologic Assessment:   Tone: normal   Sensation: WNL   Reflexes: not tested    Special Tests:   - Spurlings  -SLR    Mobility:   Transitional Movements: WFL    Gait: WFL    Balance: WNL    Functional Measure:      In compliance with CMSs Claims Based Outcome Reporting, the following G-code set was chosen for this patient based on their primary functional limitation being treated: The outcome measure chosen to determine the severity of the functional limitation was the NDI with a score of 25/50 which was correlated with the impairment scale. ? Mobility - Walking and Moving Around:     - CURRENT STATUS: CK - 40%-59% impaired, limited or restricted    - GOAL STATUS: CI - 1%-19% impaired, limited or restricted    - D/C STATUS:  ---------------To be determined---------------      Physical Therapy Evaluation Charge Determination   History Examination Presentation Decision-Making   LOW Complexity : Zero comorbidities / personal factors that will impact the outcome / POC LOW Complexity : 1-2 Standardized tests and measures addressing body structure, function, activity limitation and / or participation in recreation  LOW Complexity : Stable, uncomplicated  LOW Complexity : FOTO score of       Based on the above components, the patient evaluation is determined to be of the following complexity level: LOW     TREATMENT/INTERVENTION:  Modalities (Rationale): MHP post treatment to decrease pain and muscle guarding      Therapeutic Exercises:  HEP LTR, KTC, Bridges, Chin tucks (supine), UT stretch (B/L)  Shoulder blade squeeze    Clinic Activities    Flexion stretch over blue ball  Flexion/ext over back of chair  Side Bend stretch      Supine  Chin Tuck 5 reps with 20 sec hold  Bridges x 15 reps  LTR x 15  Single leg lumbar stretch 3 reps with 30 sec hold B/L    Seated  UT stretch 3 reps with 15 sec hold  Flexion over blue ball x 10 reps  Flexion/ ext over back of chair x 7reps    Standing  Rows with green TB x 15 reps  Pull downs at [employer] 25# x 15 reps    All 4's  Alt UE and Alt LE  10 reps2  Child's pose 3 reps with 20 sec hold.     UBE/LBE 5 min resistance of 6    Manual Therapy:  Cx Distraction Grade 2    Neuro Re-Education:  Discussed importance of sitting posture, supported lumbar    Balance Training:  none    Ambulation/Gait Training:  none    Activity tolerance and post treatment pain report: Tolerated all exercises well with no report of pain  Pain Out:     Education:  Education was provided to the patient on the following topics: Continue exercises. [x]    No changes were made to the home exercise program.  []    The following changes were made to the home exercise program:   Patient verbalized understanding of the topics presented. ASSESSMENT:   Pt reports 7/10 neck and back pain. Reviewed HEP with VC's required for form, advanced clinic activities as above with no report of pain. Will continue to advance exercises and overall fitness. Patients progression toward goals is as follows:  [x]     Improving appropriately and progressing toward goals  []     Improving slowly and progressing toward goals  []     Not making progress toward goals and plan of care will be adjusted    PLAN OF CARE:   Patient continues to benefit from skilled intervention to address the above impairments. [x]    Continue treatment per established plan of care.   []     Recommend the following changes to the plan of care:     Recommendations/Intent for next treatment: Advance as able, increase clinic exercise time    Wesley Valverde, PT   Time Calculation: 43 mins  Patient Time in clinic:   Start Time: 1005   Stop Time: [phone]

## 2018-08-23 ENCOUNTER — HOSPITAL ENCOUNTER (OUTPATIENT)
Dept: PHYSICAL THERAPY | Age: 43
Discharge: HOME | End: 2018-08-23
Payer: MEDICAID

## 2018-08-23 PROCEDURE — 97110 THERAPEUTIC EXERCISES: CPT | Performed by: PHYSICAL THERAPIST

## 2018-08-23 NOTE — PROGRESS NOTES
[facility]  [address], [address] [facility]    OUTPATIENT physical Therapy DAILY Treatment NOTe  Visit: 4    NAME: Ana Asencio AGE: 38 y.o. GENDER: male  DATE: 8/23/2018  REFERRING PHYSICIAN: Faustina Marshall MD      GOALS  Short term goals  Time frame: 3 weeks  1. Patient will be compliant and independent with the initial HEP as evidenced by being able to perform without cueing. 2. Patient will report a 50% improvement in symptoms. 3. Patient report a 50% improvement in sleeping. 4. Patient will tolerate 20 minutes of clinic activities before onset of symptoms. Long term goals  Time frame: 6 weeks  1. Patient will report pain level decrease to 2/10 to allow increased ease of movement. 2. Patient will have an improved score on the NDI outcome measure by 20 points to demonstrate an increase in functional activity tolerance. 3. Patient will be independent in final individualized HEP. 4. Patient will return to work without being limited by symptoms. 5. Patient will sleep 6-8 hours without being interrupted by pain. SUBJECTIVE:   \"It still hurts but it's feeling better. \"    Pain In: 7/10 neck and low back    OBJECTIVE DATA SUMMARY:   Objective data from initial evaluation:  Pain:  Location: neck and low back  Quality: aching  Now: Neck  8/10  Back 8/10  Factors that improve pain: rest lying down    Posture:   Head forward and rounded shoulders    Strength:   UE strength WNL    Range of Motion:   Cx ROM   75% of normal flexion/ext and b/L rotation    Spinal Assessment:   Flattened lumbar spine      Joint Mobility:   Cx hypomobility with distraction and side glide    Palpation:   Mild TTP Cx paraspinals and lumbar paraspinals    Neurologic Assessment:   Tone: normal   Sensation: WNL   Reflexes: not tested    Special Tests:   - Spurlings  -SLR    Mobility:   Transitional Movements: WFL    Gait: WFL    Balance: WNL    Functional Measure:      In compliance with CMSs Claims Based Outcome Reporting, the following G-code set was chosen for this patient based on their primary functional limitation being treated: The outcome measure chosen to determine the severity of the functional limitation was the NDI with a score of 25/50 which was correlated with the impairment scale. ? Mobility - Walking and Moving Around:     - CURRENT STATUS: CK - 40%-59% impaired, limited or restricted    - GOAL STATUS: CI - 1%-19% impaired, limited or restricted    - D/C STATUS:  ---------------To be determined---------------      TREATMENT/INTERVENTION:  Modalities (Rationale): to decrease pain and muscle guarding  MHP to neck and low back for 10 minutes at end of session; no skin irritation noted    Therapeutic Exercises:  HEP LTR, KTC, Bridges, Chin tucks (supine), UT stretch (B/L)  Shoulder blade squeeze    Clinic Activities in BOLD performed this date:       UBE/LBE 5 min resistance of 6    Supine  Chin Tuck 5 reps with 20 sec hold  Bridges x 15 reps  LTR x 15 reps  Single leg lumbar stretch 3 reps with 30 sec hold B/L  SKTC: 5 reps B    Seated  UT stretch: 3 reps with 30 sec hold  Flexion over blue ball: x 10 reps  Flexion/ ext over back of chair: 10 reps  Thoracolumbar side stretch: 5 reps B  Trunk rotation stretch: 5 reps B    Standing  Rows with green TB: 2 x 15 reps  Pull downs at Quantum 25#: 2 x 15 reps    All 4's  Alt UE and Alt LE  10 reps2  Child's pose 2 reps with 20 sec hold; max cues    Manual Therapy:  Cx Distraction Grade 2    Neuro Re-Education:  Discussed importance of sitting posture, supported lumbar    Activity tolerance and post treatment pain report:   Good  Pain Out: 5/10    Education:  Education was provided to the patient on the following topics: Continue exercises.   [x]    No changes were made to the home exercise program.  []    The following changes were made to the home exercise program:   Patient verbalized understanding of the topics presented. ASSESSMENT:   Patient presents with mildly decreased pain in neck and low back. Patient reports regular performance of HEP but requires verbal cueing for all exercises this date. Patient reports that he is limited in lifting greater than 30 pounds or so. Will continue to advance exercises and overall fitness. Patients progression toward goals is as follows:  [x]     Improving appropriately and progressing toward goals  []     Improving slowly and progressing toward goals  []     Not making progress toward goals and plan of care will be adjusted    PLAN OF CARE:   Patient continues to benefit from skilled intervention to address the above impairments. [x]    Continue treatment per established plan of care.   []     Recommend the following changes to the plan of care:     Recommendations/Intent for next treatment: Advance as able, increase clinic exercise time    Payam Loving PT   Time Calculation: 40 mins  Patient Time in clinic:   Start Time: 1030   Stop Time: 1110

## 2018-08-28 ENCOUNTER — HOSPITAL ENCOUNTER (OUTPATIENT)
Dept: PHYSICAL THERAPY | Age: 43
Discharge: HOME | End: 2018-08-28
Payer: MEDICAID

## 2018-08-28 PROCEDURE — 97110 THERAPEUTIC EXERCISES: CPT

## 2018-08-28 NOTE — PROGRESS NOTES
[address] OUTPATIENT physical Therapy DAILY Treatment NOTe Visit: 5 NAME: Dre Ascencio AGE: 38 y.o. GENDER: male DATE: 8/28/2018 REFERRING PHYSICIAN: Francesca Delgado MD 
 
 
GOALS Short term goals Time frame: 3 weeks 1. Patient will be compliant and independent with the initial HEP as evidenced by being able to perform without cueing. 2. Patient will report a 50% improvement in symptoms. 3. Patient report a 50% improvement in sleeping. 4. Patient will tolerate 20 minutes of clinic activities before onset of symptoms. Long term goals Time frame: 6 weeks 1. Patient will report pain level decrease to 2/10 to allow increased ease of movement. 2. Patient will have an improved score on the NDI outcome measure by 20 points to demonstrate an increase in functional activity tolerance. 3. Patient will be independent in final individualized HEP. 4. Patient will return to work without being limited by symptoms. 5. Patient will sleep 6-8 hours without being interrupted by pain. SUBJECTIVE:  
\"It still hurts but it's feeling better. \" 
 
Pain In: 7/10 neck and low back OBJECTIVE DATA SUMMARY:  
Objective data from initial evaluation: 
Pain: 
Location: neck and low back Quality: aching Now: Neck  8/10  Back 8/10 Factors that improve pain: rest lying down Posture:  
Head forward and rounded shoulders Strength:  
UE strength WNL Range of Motion:  
Cx ROM 75% of normal flexion/ext and b/L rotation Spinal Assessment:  
Flattened lumbar spine Joint Mobility: Cx hypomobility with distraction and side glide Palpation:  
Mild TTP Cx paraspinals and lumbar paraspinals Neurologic Assessment: 
 Tone: normal 
 Sensation: WNL Reflexes: not tested Special Tests: - Spurlings 
-SLR Mobility: 
 Transitional Movements: [facility] Gait: [facility] Balance: WNL Functional Measure: In compliance with CMSs Claims Based Outcome Reporting, the following G-code set was chosen for this patient based on their primary functional limitation being treated: The outcome measure chosen to determine the severity of the functional limitation was the NDI with a score of 25/50 which was correlated with the impairment scale. ? Mobility - Walking and Moving Around:  
  - CURRENT STATUS: CK - 40%-59% impaired, limited or restricted  - GOAL STATUS: CI - 1%-19% impaired, limited or restricted  - D/C STATUS:  ---------------To be determined---------------  
  
TREATMENT/INTERVENTION: 
Modalities (Rationale): to decrease pain and muscle guarding MHP to neck and low back for 10 minutes at end of session; no skin irritation noted Therapeutic Exercises: HEP LTR, KTC, Bridges, Chin tucks (supine), UT stretch (B/L)  Shoulder blade squeeze Clinic Activities in BOLD performed this date: UBE/LBE 5 min resistance of 6 Supine Chin Tuck 5 reps with 20 sec hold Bridges x 15 reps LTR x 15 reps Single leg lumbar stretch 3 reps with 30 sec hold B/L BKTC using yellow ball x 10 reps Seated UT stretch: 3 reps with 30 sec hold Flexion over blue ball: x 10 reps Flexion/ ext over back of chair: 10 reps Thoracolumbar side stretch: 5 reps B Trunk rotation stretch: 5 reps B Standing Rows at Quantum 30# 2 sets of 10 Pull downs at Quantum 30#: 2 x 10 reps Hip extension and abduction 4# 10 reps B/L All 4's Alt LE  10 reps B/L Child's pose 2 reps with 20 sec hold; max cues Prone Push ups x 5 reps Hip extension x 5 reps B/L Manual Therapy: Cx Distraction Grade 2 Neuro Re-Education: 
Discussed importance of sitting posture, supported lumbar Activity tolerance and post treatment pain report:  
Good Pain Out: 5/10 Education: 
Education was provided to the patient on the following topics: Continue exercises. [x]    No changes were made to the home exercise program. 
[]    The following changes were made to the home exercise program:  
Patient verbalized understanding of the topics presented. ASSESSMENT:  
Patient presents with mildly decreased pain in neck and low back. Patient reports regular performance of HEP but requires verbal cueing for all exercises this date. Will continue to advance exercises and overall fitness. Begin lifting activities Patients progression toward goals is as follows: 
[x]     Improving appropriately and progressing toward goals 
[]     Improving slowly and progressing toward goals 
[]     Not making progress toward goals and plan of care will be adjusted PLAN OF CARE:  
Patient continues to benefit from skilled intervention to address the above impairments. [x]    Continue treatment per established plan of care. []     Recommend the following changes to the plan of care:  
 
Recommendations/Intent for next treatment: Advance as able, increase clinic exercise time Gloria Montana PT Time Calculation: 40 mins Patient Time in clinic: 
 Start Time: [id number] Stop Time: [phone]

## 2018-08-30 ENCOUNTER — HOSPITAL ENCOUNTER (OUTPATIENT)
Dept: PHYSICAL THERAPY | Age: 43
Discharge: HOME | End: 2018-08-30
Payer: MEDICAID

## 2018-08-30 PROCEDURE — 97110 THERAPEUTIC EXERCISES: CPT

## 2018-08-30 NOTE — PROGRESS NOTES
[address], [address] [facility] OUTPATIENT physical Therapy DAILY Treatment NOTe Visit: 6 NAME: Kelly Carlton AGE: 38 y.o. GENDER: male DATE: 8/30/2018 REFERRING PHYSICIAN: Peter Beltre MD 
 
 
GOALS Short term goals Time frame: 3 weeks 1. Patient will be compliant and independent with the initial HEP as evidenced by being able to perform without cueing. 2. Patient will report a 50% improvement in symptoms. 3. Patient report a 50% improvement in sleeping. 4. Patient will tolerate 20 minutes of clinic activities before onset of symptoms. Long term goals Time frame: 6 weeks 1. Patient will report pain level decrease to 2/10 to allow increased ease of movement. 2. Patient will have an improved score on the NDI outcome measure by 20 points to demonstrate an increase in functional activity tolerance. 3. Patient will be independent in final individualized HEP. 4. Patient will return to work without being limited by symptoms. 5. Patient will sleep 6-8 hours without being interrupted by pain. SUBJECTIVE:  
\"My neck hurts when I lay on my back. \" 
 
Pain In: 7/10 bilateral neck and low back OBJECTIVE DATA SUMMARY:  
Objective data from initial evaluation: 
Pain: 
Location: neck and low back Quality: aching Now: Neck  8/10  Back 8/10 Factors that improve pain: rest lying down Posture:  
Head forward and rounded shoulders Strength:  
UE strength WNL Range of Motion:  
Cx ROM 75% of normal flexion/ext and b/L rotation Spinal Assessment:  
Flattened lumbar spine Joint Mobility: Cx hypomobility with distraction and side glide Palpation:  
Mild TTP Cx paraspinals and lumbar paraspinals Neurologic Assessment: 
 Tone: normal 
 Sensation: WNL Reflexes: not tested Special Tests: - Spurlings 
-SLR Mobility: 
 Transitional [facility]ments: [facility] Gait: [facility] Balance: WNL Functional Measure: In compliance with CMSs Claims Based Outcome Reporting, the following G-code set was chosen for this patient based on their primary functional limitation being treated: The outcome measure chosen to determine the severity of the functional limitation was the NDI with a score of 25/50 which was correlated with the impairment scale. ? Mobility - Walking and Moving Around:  
  - CURRENT STATUS: CK - 40%-59% impaired, limited or restricted  - GOAL STATUS: CI - 1%-19% impaired, limited or restricted  - D/C STATUS:  ---------------To be determined---------------  
  
TREATMENT/INTERVENTION: 
Modalities (Rationale): to decrease pain and muscle guarding MHP to neck and low back for 10 minutes at end of session; no skin irritation noted Therapeutic Exercises: HEP LTR, KTC, Bridges, Chin tucks (supine), UT stretch (B/L)  Shoulder blade squeeze Clinic Activities in BOLD performed this date: UBE/LBE 5 min resistance of 6 Supine Chin Tuck 5 reps with 20 sec hold Bridges x 15 reps LTR x 15 reps Single leg lumbar stretch 3 reps with 30 sec hold B/L BKTC using yellow ball x 10 reps Serratus punches 5# dowel x15 reps Seated UT stretch: 3 reps with 30 sec hold Flexion over blue ball: x 10 reps Flexion/ ext over back of chair: 10 reps Thoracolumbar side stretch: 5 reps B Trunk rotation stretch: 5 reps B Standing Rows at Quantum 30# 2 sets of 10 Pull downs at Quantum 30#: 2 x 10 reps Hip extension and abduction green TB 2x10 reps B/L 
D2 UE flexion at quantum [employer] All 4's Alt LE  10 reps B/L Child's pose 2 reps with 20 sec hold; max cues Prone Push ups x 5 reps Hip extension x 5 reps B/L Manual Therapy: Cx Distraction Grade 2 Neuro Re-Education: 
Discussed importance of sitting posture, supported lumbar Activity tolerance and post treatment pain report:  
Good Pain Out: 6/10 Education: Education was provided to the patient on the following topics: Continue exercises. [x]    No changes were made to the home exercise program. 
[]    The following changes were made to the home exercise program:  
Patient verbalized understanding of the topics presented. ASSESSMENT:  
Patient demonstrated improved activity tolerance and presents with slowly improving pain levels. Challenged with form for exercises with frequent cues to hold stretches longer and for movement patterns with strengthening exercises. Pain improved overall from beginning to end of session today. Will continue to progress clinical activities with increased resistance. Patients progression toward goals is as follows: 
[x]     Improving appropriately and progressing toward goals 
[]     Improving slowly and progressing toward goals 
[]     Not making progress toward goals and plan of care will be adjusted PLAN OF CARE:  
Patient continues to benefit from skilled intervention to address the above impairments. [x]    Continue treatment per established plan of care. []     Recommend the following changes to the plan of care:  
 
Recommendations/Intent for next treatment: Advance as able, increase clinic exercise time Cory Pillai PT, DPT Time Calculation: 53 mins Patient Time in clinic: 
 Start Time: [phone] Stop Time: [phone]

## 2018-09-04 ENCOUNTER — HOSPITAL ENCOUNTER (OUTPATIENT)
Dept: PHYSICAL THERAPY | Age: 43
Discharge: HOME | End: 2018-09-04
Payer: MEDICAID

## 2018-09-04 PROCEDURE — 97110 THERAPEUTIC EXERCISES: CPT

## 2018-09-04 NOTE — PROGRESS NOTES
[facility] OUTPATIENT physical Therapy DAILY Treatment NOTe Visit: 7 NAME: Trish Villarreal AGE: 38 y.o. GENDER: male DATE: 9/4/2018 REFERRING PHYSICIAN: Lindsay Brush MD 
 
 
GOALS Short term goals Time frame: 3 weeks 1. Patient will be compliant and independent with the initial HEP as evidenced by being able to perform without cueing. 2. Patient will report a 50% improvement in symptoms. 3. Patient report a 50% improvement in sleeping. 4. Patient will tolerate 20 minutes of clinic activities before onset of symptoms. Long term goals Time frame: 6 weeks 1. Patient will report pain level decrease to 2/10 to allow increased ease of movement. 2. Patient will have an improved score on the NDI outcome measure by 20 points to demonstrate an increase in functional activity tolerance. 3. Patient will be independent in final individualized HEP. 4. Patient will return to work without being limited by symptoms. 5. Patient will sleep 6-8 hours without being interrupted by pain. SUBJECTIVE:  
\"My neck hurts when I lay on my back. \" 
 
Pain In: 7/10 bilateral neck and low back OBJECTIVE DATA SUMMARY:  
Objective data from initial evaluation: 
Pain: 
Location: neck and low back Quality: aching Now: Neck  8/10  Back 8/10 Factors that improve pain: rest lying down Posture:  
Head forward and rounded shoulders Strength:  
UE strength WNL Range of Motion:  
Cx ROM 75% of normal flexion/ext and b/L rotation Spinal Assessment:  
Flattened lumbar spine Joint Mobility: Cx hypomobility with distraction and side glide Palpation:  
Mild TTP Cx paraspinals and lumbar paraspinals Neurologic Assessment: 
 Tone: normal 
 Sensation: WNL Reflexes: not tested Special Tests: - Spurlings 
-SLR Mobility: 
 [facility]itional [facility]ments: [facility] Gait: [facility] Balance: WNL Functional Measure: In compliance with CMSs Claims Based Outcome Reporting, the following G-code set was chosen for this patient based on their primary functional limitation being treated: The outcome measure chosen to determine the severity of the functional limitation was the NDI with a score of 25/50 which was correlated with the impairment scale. ? Mobility - Walking and Moving Around:  
  - CURRENT STATUS: CK - 40%-59% impaired, limited or restricted  - GOAL STATUS: CI - 1%-19% impaired, limited or restricted  - D/C STATUS:  ---------------To be determined---------------  
  
TREATMENT/INTERVENTION: 
Modalities (Rationale): to decrease pain and muscle guarding MHP to neck and low back for 10 minutes at end of session; no skin irritation noted Therapeutic Exercises: HEP LTR, KTC, Bridges, Chin tucks (supine), UT stretch (B/L)  Shoulder blade squeeze Clinic Activities in BOLD performed this date: UBE/LBE 5 min resistance of 6 Pt walked 100' carrying 30#, focused on carrying wt close to body, good tolerance Supine Chin Tuck 5 reps with 20 sec hold Bridges x 10 reps 2 sets Single leg lumbar stretch 3 reps with 30 sec hold B/L (leg crossed over) BKTC using yellow ball x 10 reps Seated UT stretch: 3 reps with 30 sec hold Flexion/ ext over back of chair: 10 reps Thoracolumbar side stretch: 5 reps B Trunk rotation stretch: 5 reps B Standing Rows at Quantum 30# 2 sets of 10 Pull downs at Quantum 30#: 2 sets x 10 reps Hip extension and abduction 10 reps 2 sets B/L 4# 
D2 UE flexion at quantum [employer] [facility] All 4's Alt LE  10 reps B/L Child's pose 2 reps with 20 sec hold; max cues Prone Push ups x 5 reps Hip extension x 5 reps B/L Manual Therapy: Cx Distraction Grade 2 Neuro Re-Education: 
Discussed importance of sitting posture, supported lumbar Activity tolerance and post treatment pain report:  
Good Pain Out: 6/10 Education: Education was provided to the patient on the following topics: Continue exercises. [x]    No changes were made to the home exercise program. 
[]    The following changes were made to the home exercise program:  
Patient verbalized understanding of the topics presented. ASSESSMENT:  
Patient demonstrated improved activity tolerance and presents with slowly improving pain levels. Challenged with form for exercises with frequent cues to hold stretches longer and for movement patterns with strengthening exercises. Pain improved overall from beginning to end of session today. Will continue to progress clinical activities with increased resistance. Patients progression toward goals is as follows: 
[x]     Improving appropriately and progressing toward goals 
[]     Improving slowly and progressing toward goals 
[]     Not making progress toward goals and plan of care will be adjusted PLAN OF CARE:  
Patient continues to benefit from skilled intervention to address the above impairments. [x]    Continue treatment per established plan of care. []     Recommend the following changes to the plan of care:  
 
Recommendations/Intent for next treatment: Advance as able, increase clinic exercise time Aida Peralta PT Patient Time in clinic:

## 2018-09-06 ENCOUNTER — HOSPITAL ENCOUNTER (OUTPATIENT)
Dept: PHYSICAL THERAPY | Age: 43
Discharge: HOME | End: 2018-09-06
Payer: MEDICAID

## 2018-09-06 PROCEDURE — 97110 THERAPEUTIC EXERCISES: CPT | Performed by: PHYSICAL THERAPIST

## 2018-09-06 NOTE — PROGRESS NOTES
[address] OUTPATIENT physical Therapy DAILY Treatment NOTe Visit: 8 NAME: James Martins AGE: 38 y.o. GENDER: male DATE: 9/6/2018 REFERRING PHYSICIAN: Aicha Benavidez MD 
 
 
GOALS Short term goals Time frame: 3 weeks 1. Patient will be compliant and independent with the initial HEP as evidenced by being able to perform without cueing. 2. Patient will report a 50% improvement in symptoms. 3. Patient report a 50% improvement in sleeping. 4. Patient will tolerate 20 minutes of clinic activities before onset of symptoms. Long term goals Time frame: 6 weeks 1. Patient will report pain level decrease to 2/10 to allow increased ease of movement. 2. Patient will have an improved score on the NDI outcome measure by 20 points to demonstrate an increase in functional activity tolerance. 3. Patient will be independent in final individualized HEP. 4. Patient will return to work without being limited by symptoms. 5. Patient will sleep 6-8 hours without being interrupted by pain. SUBJECTIVE:  
\"It's a little better. \" 
 
Pain In: 6/10 bilateral neck and low back OBJECTIVE DATA SUMMARY:  
Objective data from initial evaluation: 
Pain: 
Location: neck and low back Quality: aching Now: Neck  8/10  Back 8/10 Factors that improve pain: rest lying down Posture:  
Head forward and rounded shoulders Strength:  
UE strength WNL Range of Motion:  
Cx ROM 75% of normal flexion/ext and b/L rotation Spinal Assessment:  
Flattened lumbar spine Joint Mobility: Cx hypomobility with distraction and side glide Palpation:  
Mild TTP Cx paraspinals and lumbar paraspinals Neurologic Assessment: 
 Tone: normal 
 Sensation: WNL Reflexes: not tested Special Tests: - Spurlings 
-SLR Mobility: 
 Transitional Movements: [facility] Gait: [facility] Balance: WNL Functional Measure: In compliance with CMSs Claims Based Outcome Reporting, the following G-code set was chosen for this patient based on their primary functional limitation being treated: The outcome measure chosen to determine the severity of the functional limitation was the NDI with a score of 25/50 which was correlated with the impairment scale. ? Mobility - Walking and Moving Around:  
  - CURRENT STATUS: CK - 40%-59% impaired, limited or restricted  - GOAL STATUS: CI - 1%-19% impaired, limited or restricted  - D/C STATUS:  ---------------To be determined---------------  
  
TREATMENT/INTERVENTION: 
Modalities (Rationale): to decrease pain and muscle guarding MHP to neck and low back for 10 minutes at end of session; no skin irritation noted Therapeutic Exercises: HEP: LTR, KTC, Bridges, Chin tucks (supine), UT stretch (B/L)  Shoulder blade squeeze Clinic Activities in BOLD performed this date: UBE/LBE 5 min resistance of 6 Supine Chin Tucks: 5 reps with 20 sec hold Bridges: 2 sets of 10 reps LTR: 10 reps Single leg lumbar stretch 3 reps with 30 sec hold B/L (leg crossed over) BKTC using yellow ball x 10 reps Sidelying lumbar rotation stretch: 2 reps with 30 second holds B Seated UT stretch: 3 reps with 30 sec hold Lower cervical/upper thoracic stretch: 3 reps with 15 second holds Flexion/ ext over back of chair: 10 reps Forward flexion over blue physioball: 10 reps Thoracolumbar side stretch: 5 reps B Trunk rotation stretch: 5 reps B Standing Rows at Quantum 30# 2 sets of 10 reps Pull downs at Quantum 30#: 2 sets of 10 reps Crossover pull downs with green TB: 15 reps X's with green TB: 15 reps Hip extension and abduction with red TB: 10 reps 2 sets B/L All 4's Alt LE  10 reps B/L Child's pose 2 reps with 20 sec hold; max cues Prone Push ups x 5 reps Hip extension x 5 reps B/L Manual Therapy: Cx Distraction Grade 2 Neuro Re-Education: Discussed importance of sitting posture, lumbar support in sitting Activity tolerance and post treatment pain report:  
Good Pain Out: 5/10 Education: 
Education was provided to the patient on the following topics: Continue exercises. [x]    No changes were made to the home exercise program. 
[]    The following changes were made to the home exercise program:  
Patient verbalized understanding of the topics presented. ASSESSMENT:  
Patient presents with slight decrease in pain to 6/10 in neck and low back. Patient reports that pain is bilateral in both neck and back. No trunk flexion or extension preference. Patient without hesitancy with movements. Patient demonstrates improved activity tolerance. Patient requires repeated verbal, visual, and tactile cues for majority of exercises. Will continue to progress clinical activities with increased resistance. Patients progression toward goals is as follows: 
[x]     Improving appropriately and progressing toward goals 
[]     Improving slowly and progressing toward goals 
[]     Not making progress toward goals and plan of care will be adjusted PLAN OF CARE:  
Patient continues to benefit from skilled intervention to address the above impairments. [x]    Continue treatment per established plan of care. []     Recommend the following changes to the plan of care:  
 
Recommendations/Intent for next treatment: Advance as able, increase clinic exercise time; IASTM? Nam Cheung, PT Time Calculation: 34 mins Patient Time in clinic: 
 Start Time: [phone] Stop Time: [phone]

## 2018-09-11 ENCOUNTER — HOSPITAL ENCOUNTER (OUTPATIENT)
Dept: PHYSICAL THERAPY | Age: 43
Discharge: HOME | End: 2018-09-11
Payer: MEDICAID

## 2018-09-11 PROCEDURE — 97110 THERAPEUTIC EXERCISES: CPT

## 2018-09-11 NOTE — PROGRESS NOTES
[address] OUTPATIENT physical Therapy DAILY Treatment NOTe Visit: 9 NAME: Jenise Hobson AGE: 38 y.o. GENDER: male DATE: 9/11/2018 REFERRING PHYSICIAN: Jenny Lopez MD 
 
 
GOALS Short term goals Time frame: 3 weeks 1. Patient will be compliant and independent with the initial HEP as evidenced by being able to perform without cueing. 2. Patient will report a 50% improvement in symptoms. 3. Patient report a 50% improvement in sleeping. 4. Patient will tolerate 20 minutes of clinic activities before onset of symptoms. Long term goals Time frame: 6 weeks 1. Patient will report pain level decrease to 2/10 to allow increased ease of movement. 2. Patient will have an improved score on the NDI outcome measure by 20 points to demonstrate an increase in functional activity tolerance. 3. Patient will be independent in final individualized HEP. 4. Patient will return to work without being limited by symptoms. 5. Patient will sleep 6-8 hours without being interrupted by pain. SUBJECTIVE:  
\"It's a little better. \" 
 
Pain In: 5/10 bilateral neck and low back \"I'm not going back to work until Therapy is over\" OBJECTIVE DATA SUMMARY:  
Objective data from initial evaluation: 
Pain: 
Location: neck and low back Quality: aching Now: Neck  8/10  Back 8/10 Factors that improve pain: rest lying down Posture:  
Head forward and rounded shoulders Strength:  
UE strength WNL Range of Motion:  
Cx ROM 75% of normal flexion/ext and b/L rotation Spinal Assessment:  
Flattened lumbar spine Joint Mobility: Cx hypomobility with distraction and side glide Palpation:  
Mild TTP Cx paraspinals and lumbar paraspinals Neurologic Assessment: 
 Tone: normal 
 Sensation: WNL Reflexes: not tested Special Tests: - Spurlings 
-SLR Mobility: 
 Transitional Movements: [employer] [place] Gait: [place] [facility] [employer] [facility] Balance: WNL Functional Measure: In compliance with CMSs Claims Based Outcome Reporting, the following G-code set was chosen for this patient based on their primary functional limitation being treated: The outcome measure chosen to determine the severity of the functional limitation was the NDI with a score of 25/50 which was correlated with the impairment scale. ? Mobility - Walking and Moving Around:  
  - CURRENT STATUS: CK - 40%-59% impaired, limited or restricted  - GOAL STATUS: CI - 1%-19% impaired, limited or restricted  - D/C STATUS:  ---------------To be determined---------------  
  
TREATMENT/INTERVENTION: 
Modalities (Rationale): to decrease pain and muscle guarding MHP to neck and low back for 10 minutes at end of session; no skin irritation noted Therapeutic Exercises: HEP: LTR, KTC, Bridges, Chin tucks (supine), UT stretch (B/L)  Shoulder blade squeeze Clinic Activities in BOLD performed this date: UBE/LBE 6 min resistance of 6 Supine Bridges: 2 sets of 10 reps with 10# Single leg lumbar stretch 3 reps with 30 sec hold B/L (leg crossed over) BKTC using yellow ball x 20 reps Sidelying lumbar rotation stretch: 2 reps with 30 second holds B Seated UT stretch: 3 reps with 30 sec hold Lower cervical/upper thoracic stretch: 3 reps with 15 second holds Flexion/ ext over back of chair: 10 reps Forward flexion over blue physioball: 10 reps Thoracolumbar side stretch: 5 reps B Trunk rotation stretch: 5 reps B Standing Rows at Quantum 30# 2 sets of 10 reps Pull downs at Quantum 30#: 2 sets of 10 reps Crossover pull downs with green TB: 15 reps X's with green TB: 15 reps Hip extension and abduction with green TB: 10 reps 2 sets B/L 
HS stretch 20 sec hold  3 reps Hip extension with trunk on edge of mat 20 reps B/L Work Simulation Pt carried 30# in crate 20 ft, 5 reps All 4's Alt LE  10 reps B/L Child's pose 2 reps with 20 sec hold; max cues Prone Push ups x 5 reps Hip extension x 5 reps B/L Manual Therapy: Cx Distraction Grade 2 Neuro Re-Education: 
Discussed importance of sitting posture, lumbar support in sitting Activity tolerance and post treatment pain report:  
Good Pain Out: 5/10 Education: 
Education was provided to the patient on the following topics: Continue exercises. [x]    No changes were made to the home exercise program. 
[]    The following changes were made to the home exercise program:  
Patient verbalized understanding of the topics presented. ASSESSMENT:  
Patient presents with slight decrease in pain to 5/10 in neck and low back. Discussed pt return to Dr and eventual RTW. Patient without hesitancy during  Movements and demonstrates improved activity tolerance. Patient requires repeated verbal, visual, and tactile cues for majority of exercises. Will continue to progress clinical activities with increased resistance and work simnulation. Patients progression toward goals is as follows: 
[x]     Improving appropriately and progressing toward goals 
[]     Improving slowly and progressing toward goals 
[]     Not making progress toward goals and plan of care will be adjusted PLAN OF CARE:  
Patient continues to benefit from skilled intervention to address the above impairments. [x]    Continue treatment per established plan of care. []     Recommend the following changes to the plan of care:  
 
Recommendations/Intent for next treatment: Advance as able, increase clinic exercise time; IASTM? Increase work simulation Silvana Dao, PT Time Calculation: 55 mins Patient Time in clinic: 
 Start Time: 12 Stop Time: 1600

## 2018-09-13 ENCOUNTER — HOSPITAL ENCOUNTER (OUTPATIENT)
Dept: PHYSICAL THERAPY | Age: 43
Discharge: HOME | End: 2018-09-13
Payer: MEDICAID

## 2018-09-13 PROCEDURE — 97014 ELECTRIC STIMULATION THERAPY: CPT | Performed by: PHYSICAL THERAPIST

## 2018-09-13 PROCEDURE — 97110 THERAPEUTIC EXERCISES: CPT | Performed by: PHYSICAL THERAPIST

## 2018-09-13 NOTE — PROGRESS NOTES
[address] OUTPATIENT physical Therapy DAILY Treatment NOTe Visit: 10 NAME: Ford Chanel AGE: 38 y.o. GENDER: male DATE: 9/13/2018 REFERRING PHYSICIAN: Lilia Delong MD 
 
 
GOALS Short term goals Time frame: 3 weeks 1. Patient will be compliant and independent with the initial HEP as evidenced by being able to perform without cueing. 2. Patient will report a 50% improvement in symptoms. 3. Patient report a 50% improvement in sleeping. 4. Patient will tolerate 20 minutes of clinic activities before onset of symptoms. Long term goals Time frame: 6 weeks 1. Patient will report pain level decrease to 2/10 to allow increased ease of movement. 2. Patient will have an improved score on the NDI outcome measure by 20 points to demonstrate an increase in functional activity tolerance. 3. Patient will be independent in final individualized HEP. 4. Patient will return to work without being limited by symptoms. 5. Patient will sleep 6-8 hours without being interrupted by pain. SUBJECTIVE:  
\"It still hurts\" Pain In: 6/10 bilateral neck and low back OBJECTIVE DATA SUMMARY:  
Objective data from initial evaluation: 
Pain: 
Location: neck and low back Quality: aching Now: Neck  8/10  Back 8/10 Factors that improve pain: rest lying down Posture:  
Head forward and rounded shoulders Strength:  
UE strength WNL Range of Motion:  
Cx ROM 75% of normal flexion/ext and b/L rotation Spinal Assessment:  
Flattened lumbar spine Joint Mobility: Cx hypomobility with distraction and side glide Palpation:  
Mild TTP Cx paraspinals and lumbar paraspinals Neurologic Assessment: 
 Tone: normal 
 Sensation: WNL Reflexes: not tested Special Tests: - Spurlings 
-SLR Mobility: 
 Transitional Movements: [facility] Gait: [facility] Balance: WNL Functional Measure: In compliance with CMSs Claims Based Outcome Reporting, the following G-code set was chosen for this patient based on their primary functional limitation being treated: The outcome measure chosen to determine the severity of the functional limitation was the NDI with a score of 25/50 which was correlated with the impairment scale. ? Mobility - Walking and Moving Around:  
  - CURRENT STATUS: CK - 40%-59% impaired, limited or restricted  - GOAL STATUS: CI - 1%-19% impaired, limited or restricted  - D/C STATUS:  ---------------To be determined---------------  
  
TREATMENT/INTERVENTION: 
Modalities (Rationale): to decrease pain and muscle guarding MHP to neck and low back for 15 minutes at end of session; no skin irritation noted E stim to bilateral thoracic and lumbar paraspinals with MHP Therapeutic Exercises: HEP: LTR, KTC, Bridges, Chin tucks (supine), UT stretch (B/L)  Shoulder blade squeeze Clinic Activities in BOLD performed this date: UBE/LBE 6 min, resistance of 5 Supine Bridges: 2 sets of 10 reps with 10# Single leg lumbar stretch 3 reps with 30 sec hold B/L (leg crossed over) BKTC using yellow ball: 20 reps LTR: 10 reps Sidelying lumbar rotation stretch: 2 reps with 30 second holds B Seated UT stretch: 3 reps with 30 sec hold Lower cervical/upper thoracic stretch: 3 reps with 15 second holds Flexion/ ext over back of chair: 10 reps Forward flexion over blue physioball: 10 reps Thoracolumbar side stretch: 5 reps B Trunk rotation stretch: 5 reps B Standing Rows at Quantum 40# 2 sets of 10 reps Pull downs at Quantum 40#: 2 sets of 10 reps Crossover pull downs at 20#: 2 sets of 10 reps X's with green TB: 15 reps Hip extension and abduction with green TB: 2 sets of 10 reps B/L 
HS stretch 20 sec hold  3 reps Hip extension with trunk on edge of mat 20 reps B/L Work Simulation Pt carried 30# in crate 20 ft, 5 reps All 4's Alt LE  10 reps B/L Child's pose 2 reps with 20 sec hold; max cues Prone Push ups x 5 reps Hip extension x 5 reps B/L Manual Therapy: Cx Distraction Grade 2 PA mobs to thoracic and lumbar spine in prone Neuro Re-Education: 
Discussed importance of sitting posture, lumbar support in sitting Activity tolerance and post treatment pain report:  
Good Pain Out: 4-5/10 Education: 
Education was provided to the patient on the following topics: Continue exercises. [x]    No changes were made to the home exercise program. 
[]    The following changes were made to the home exercise program:  
Patient verbalized understanding of the topics presented. ASSESSMENT:  
Patient presents with continued pain at 6/10 in neck and low back. Patient reports generalized pain throughout low back but reports no pain increase with any clinic activities. Patient without hesitancy during movements and demonstrates improved activity tolerance. Patient requires repeated verbal, visual, and tactile cues for majority of exercises. Will continue to progress clinical activities with increased resistance and work simnulation. Trial e stim today; pain relief following intervention. Patients progression toward goals is as follows: 
[x]     Improving appropriately and progressing toward goals 
[]     Improving slowly and progressing toward goals 
[]     Not making progress toward goals and plan of care will be adjusted PLAN OF CARE:  
Patient continues to benefit from skilled intervention to address the above impairments. [x]    Continue treatment per established plan of care. []     Recommend the following changes to the plan of care:  
 
Recommendations/Intent for next treatment: Increase work simulation Nathen Trejo PT Time Calculation: 43 mins Patient Time in clinic: 
 Start Time: [phone] Stop Time: [phone]

## 2018-09-28 ENCOUNTER — HOSPITAL ENCOUNTER (OUTPATIENT)
Dept: PHYSICAL THERAPY | Age: 43
Discharge: HOME | End: 2018-09-28
Payer: MEDICAID

## 2018-09-28 PROCEDURE — 97014 ELECTRIC STIMULATION THERAPY: CPT | Performed by: PHYSICAL THERAPIST

## 2018-09-28 PROCEDURE — 97110 THERAPEUTIC EXERCISES: CPT | Performed by: PHYSICAL THERAPIST

## 2018-09-28 NOTE — PROGRESS NOTES
[address] OUTPATIENT physical Therapy DAILY Treatment NOTe Visit: 11 NAME: Rylie Sabillon AGE: 37 y.o. GENDER: male DATE: 9/28/2018 REFERRING PHYSICIAN: Juma Souza MD 
 
 
GOALS Short term goals Time frame: 3 weeks 1. Patient will be compliant and independent with the initial HEP as evidenced by being able to perform without cueing. 2. Patient will report a 50% improvement in symptoms. 3. Patient report a 50% improvement in sleeping. 4. Patient will tolerate 20 minutes of clinic activities before onset of symptoms. Long term goals Time frame: 6 weeks 1. Patient will report pain level decrease to 2/10 to allow increased ease of movement. 2. Patient will have an improved score on the NDI outcome measure by 20 points to demonstrate an increase in functional activity tolerance. 3. Patient will be independent in final individualized HEP. 4. Patient will return to work without being limited by symptoms. 5. Patient will sleep 6-8 hours without being interrupted by pain. SUBJECTIVE:  
\"It still hurts. I fell at work so that made it hurt more\" Pain In: 7/10 bilateral neck and low back OBJECTIVE DATA SUMMARY:  
Objective data from initial evaluation: 
Pain: 
Location: neck and low back Quality: aching Now: Neck  8/10  Back 8/10 Factors that improve pain: rest lying down Posture:  
Head forward and rounded shoulders Strength:  
UE strength WNL Range of Motion:  
Cx ROM 75% of normal flexion/ext and b/L rotation Spinal Assessment:  
Flattened lumbar spine Joint Mobility: Cx hypomobility with distraction and side glide Palpation:  
Mild TTP Cx paraspinals and lumbar paraspinals Neurologic Assessment: 
 Tone: normal 
 Sensation: WNL Reflexes: not tested Special Tests: - Spurlings 
-SLR Mobility: 
 Transitional Movements: [employer] [place] [facility]ait: [facility] Balance: WNL Functional Measure: In compliance with CMSs Claims Based Outcome Reporting, the following G-code set was chosen for this patient based on their primary functional limitation being treated: The outcome measure chosen to determine the severity of the functional limitation was the NDI with a score of 25/50 which was correlated with the impairment scale. ? Mobility - Walking and Moving Around:  
  - CURRENT STATUS: CK - 40%-59% impaired, limited or restricted  - GOAL STATUS: CI - 1%-19% impaired, limited or restricted  - D/C STATUS:  ---------------To be determined---------------  
  
TREATMENT/INTERVENTION: 
Modalities (Rationale): to decrease pain and muscle guarding MHP to neck and low back for 15 minutes at end of session; no skin irritation noted E stim to bilateral UT and lower thoracic paraspinals with MHP Therapeutic Exercises: HEP: LTR, KTC, Bridges, Chin tucks (supine), UT stretch (B/L)  Shoulder blade squeeze Clinic Activities in BOLD performed this date: UBE/LBE 7 min, resistance of 5 Supine Bridges: 2 sets of 10 reps with 10# Single leg lumbar stretch 3 reps with 30 sec hold B/L (leg crossed over) BKTC using yellow ball: 20 reps LTR: 10 reps Sidelying lumbar rotation stretch: 2 reps with 30 second holds B Seated UT stretch: 3 reps with 30 sec hold Lower cervical/upper thoracic stretch: 3 reps with 15 second holds Flexion/ ext over back of chair: 10 reps Forward flexion over blue physioball: 10 reps Thoracolumbar side stretch: 5 reps B Trunk rotation stretch: 5 reps B Standing Rows at Quantum 40# 2 sets of 10 reps Pull downs at Quantum 40#: 2 sets of 10 reps Crossover pull downs at 20#: 2 sets of 10 reps X's with green TB: 15 reps Hip extension and abduction with green TB: 2 sets of 10 reps B/L 
HS stretch 20 sec hold  3 reps Hip extension with trunk on edge of mat 20 reps B/L Trunk extension: 10 reps Work Simulation Pt carried 30# in crate 20 ft, 5 reps All 4's Alt LE: 10 reps B/L Child's pose: 2 reps with 20 sec hold; max cues Prone Press ups x 10 reps Hip extension x 5 reps B/L Manual Therapy: Cx Distraction Grade 2 PA mobs to thoracic and lumbar spine in prone Neuro Re-Education: 
Discussed importance of sitting posture, lumbar support in sitting Activity tolerance and post treatment pain report:  
Good Pain Out: 4-5/10 Education: 
Education was provided to the patient on the following topics: Continue exercises. [x]    No changes were made to the home exercise program. 
[]    The following changes were made to the home exercise program:  
Patient verbalized understanding of the topics presented. ASSESSMENT:  
Patient presents with continued pain at 7/10 in neck and low back. Patient reports that he fell at work which increased his pain. Patient reports generalized pain throughout low back but reports no pain increase with any clinic activities. Patient without hesitancy during movements and demonstrates improved activity tolerance. Patient with mild tenderness with PA mobs to lower thoracic and lumbar spine. Pain relief with trunk extension. Patient requires repeated verbal, visual, and tactile cues for majority of exercises; unsure of compliance with HEP. Pain relief with e stim. Will continue to progress clinical activities with increased resistance and work simnulation. Patients progression toward goals is as follows: 
[x]     Improving appropriately and progressing toward goals 
[]     Improving slowly and progressing toward goals 
[]     Not making progress toward goals and plan of care will be adjusted PLAN OF CARE:  
Patient continues to benefit from skilled intervention to address the above impairments. [x]    Continue treatment per established plan of care. []     Recommend the following changes to the plan of care: Recommendations/Intent for next treatment: Increase work simulation [place], PT Time Calculation: 41 mins Patient Time in clinic: 
 Start Time: 1000 Stop Time: [phone]

## 2018-10-01 ENCOUNTER — HOSPITAL ENCOUNTER (OUTPATIENT)
Dept: PHYSICAL THERAPY | Age: 43
Discharge: HOME | End: 2018-10-01
Payer: MEDICAID

## 2018-10-01 PROCEDURE — 97110 THERAPEUTIC EXERCISES: CPT

## 2018-10-01 NOTE — PROGRESS NOTES
[address] OUTPATIENT physical Therapy DAILY Treatment NOTe Visit: 12 NAME: Evelyn Smith AGE: 37 y.o. GENDER: male DATE: 10/1/2018 REFERRING PHYSICIAN: Ofelia Connelly MD 
 
 
GOALS Short term goals Time frame: 3 weeks 1. Patient will be compliant and independent with the initial HEP as evidenced by being able to perform without cueing. 2. Patient will report a 50% improvement in symptoms. 3. Patient report a 50% improvement in sleeping. 4. Patient will tolerate 20 minutes of clinic activities before onset of symptoms. Long term goals Time frame: 6 weeks 1. Patient will report pain level decrease to 2/10 to allow increased ease of movement. 2. Patient will have an improved score on the NDI outcome measure by 20 points to demonstrate an increase in functional activity tolerance. 3. Patient will be independent in final individualized HEP. 4. Patient will return to work without being limited by symptoms. 5. Patient will sleep 6-8 hours without being interrupted by pain. SUBJECTIVE:  
\"It still hurts. II slipped when I was lifting some boxes in my garage Pain In: 7/10 bilateral neck and low back OBJECTIVE DATA SUMMARY:  
Objective data from initial evaluation: 
Pain: 
Location: neck and low back Quality: aching Now: Neck  8/10  Back 8/10 Factors that improve pain: rest lying down Posture:  
Head forward and rounded shoulders Strength:  
UE strength WNL Range of Motion:  
Cx ROM 75% of normal flexion/ext and b/L rotation Spinal Assessment:  
Flattened lumbar spine Joint Mobility: Cx hypomobility with distraction and side glide Palpation:  
Mild TTP Cx paraspinals and lumbar paraspinals Neurologic Assessment: 
 Tone: normal 
 Sensation: WNL Reflexes: not tested Special Tests: - Spurlings 
-SLR Mobility: 
 Transitional Movements: [facility] Gait: [facility] Balance: WNL Functional Measure: In compliance with CMSs Claims Based Outcome Reporting, the following G-code set was chosen for this patient based on their primary functional limitation being treated: The outcome measure chosen to determine the severity of the functional limitation was the NDI with a score of 25/50 which was correlated with the impairment scale. ? Mobility - Walking and Moving Around:  
  - CURRENT STATUS: CK - 40%-59% impaired, limited or restricted  - GOAL STATUS: CI - 1%-19% impaired, limited or restricted  - D/C STATUS:  ---------------To be determined---------------  
  
TREATMENT/INTERVENTION: 
Modalities (Rationale): to decrease pain and muscle guarding MHP to neck and low back for 15 minutes at end of session; no skin irritation noted E stim to bilateral UT and lower thoracic paraspinals with MHP Therapeutic Exercises: HEP: LTR, KTC, Bridges, Chin tucks (supine), UT stretch (B/L)  Shoulder blade squeeze Clinic Activities in BOLD performed this date: UBE/LBE 7 min, resistance of 5 Supine Bridges: 2 sets of 10 reps with 10# Single leg lumbar stretch 3 reps with 30 sec hold B/L (leg crossed over) BKTC using yellow ball: 20 reps LTR: 10 reps Sidelying lumbar rotation stretch: 2 reps with 30 second holds B Seated UT stretch: 3 reps with 30 sec hold Lower cervical/upper thoracic stretch: 3 reps with 15 second holds Flexion/ ext over back of chair: 10 reps Forward flexion over blue physioball: 10 reps Thoracolumbar side stretch: 5 reps B Trunk rotation stretch: 5 reps B Standing Rows at Quantum 35# 2 sets of 10 reps Pull downs at Quantum 40#: 2 sets of 10 reps Crossover pull downs at 10#: 2 sets of 10 reps X's with green TB: 15 reps Hip extension and abduction with green TB: 2 sets of 10 reps B/L 
HS stretch 20 sec hold  3 reps Hip extension with trunk on edge of mat 20 reps B/L Trunk extension: 10 reps Work Simulation Pt carried 32# in crate 20 ft, 5 reps All 4's Alt LE: 10 reps B/L Child's pose: 2 reps with 20 sec hold; max cues Prone Press ups x 10 reps Hip extension x 5 reps B/L Manual Therapy: Cx Distraction Grade 2 PA mobs to thoracic and lumbar spine in prone Neuro Re-Education: 
Discussed importance of sitting posture, lumbar support in sitting Activity tolerance and post treatment pain report:  
Good Pain Out: 4-5/10 Education: 
Education was provided to the patient on the following topics: Continue exercises. [x]    No changes were made to the home exercise program. 
[]    The following changes were made to the home exercise program:  
Patient verbalized understanding of the topics presented. ASSESSMENT:  
Patient presents with continued pain at 7/10 in neck and low back. Patient reports that he fell while lifting boxes at work  which increased his pain. Patient reports no pain increase with any clinic activities and no hesitancy during movements. . Work simulation as above. . Patient requires repeated verbal, visual, and tactile cues for majority of exercises; Pt is reaching max benefit from PT, expect discharge next visit. Suggested pt return to MD with anticipated return to work. Patients progression toward goals is as follows: 
[x]     Improving appropriately and progressing toward goals 
[]     Improving slowly and progressing toward goals 
[]     Not making progress toward goals and plan of care will be adjusted PLAN OF CARE:  
Patient continues to benefit from skilled intervention to address the above impairments. [x]    Continue treatment per established plan of care. []     Recommend the following changes to the plan of care:  
 
Recommendations/Intent for next treatment: Expect discharge next session. Russell Avina PT Patient Time in clinic:

## 2018-10-05 ENCOUNTER — HOSPITAL ENCOUNTER (OUTPATIENT)
Dept: PHYSICAL THERAPY | Age: 43
Discharge: HOME | End: 2018-10-05
Payer: MEDICAID

## 2018-10-05 PROCEDURE — 97014 ELECTRIC STIMULATION THERAPY: CPT | Performed by: PHYSICAL THERAPIST

## 2018-10-05 PROCEDURE — 97110 THERAPEUTIC EXERCISES: CPT | Performed by: PHYSICAL THERAPIST

## 2018-10-05 NOTE — PROGRESS NOTES
[address] OUTPATIENT physical Therapy DAILY Treatment NOTe with discharge Visit: 13 NAME: Rylie Sabillon AGE: 37 y.o. GENDER: male DATE: 10/5/2018 REFERRING PHYSICIAN: Juma Souza MD 
 
 
GOALS Short term goals Time frame: 3 weeks 1. Patient will be compliant and independent with the initial HEP as evidenced by being able to perform without cueing. MET 2. Patient will report a 50% improvement in symptoms. MET 3. Patient report a 50% improvement in sleeping. MET 4. Patient will tolerate 20 minutes of clinic activities before onset of symptoms. MET Long term goals Time frame: 6 weeks 1. Patient will report pain level decrease to 2/10 to allow increased ease of movement. MET 2. Patient will have an improved score on the NDI outcome measure by 20 points to demonstrate an increase in functional activity tolerance. PARTIALLY MET -19 points 3. Patient will be independent in final individualized HEP. MET 4. Patient will return to work without being limited by symptoms. MET 5. Patient will sleep 6-8 hours without being interrupted by pain. MET SUBJECTIVE:  
\"It still hurts. II slipped when I was lifting some boxes in my garage Pain In: 0/10 bilateral neck and 2/10 low back OBJECTIVE DATA SUMMARY:  
 
Pain: 
Location: neck and low back Quality: aching Now: Neck  0/10  Back 2/10 Factors that improve pain: rest lying down Posture:  
Head forward and rounded shoulders Strength:  
UE strength WNL Range of Motion:  
Cervical Spine (AROM) Flexion  WNL Extension WNL R side bend WNL L side bend WNL R rotation WNL L rotation WNL Lumbar Spine (AROM) (*Measured 3rd finger from the floor) Flexion  2\" Extension WNL R side bend WNL L side bend WNL R rotation WNL L rotation WNL Joint Mobility:  
WNL cervical, thoracic, and lumbar spine Palpation: Mild tenderness to palpation at lower thoracic and lumbar paraspinals Neurologic Assessment: 
 Tone: normal 
 Sensation: WNL Reflexes: not tested Special Tests: - Spurlings 
-SLR Mobility: 
 Transitional Movements: [employer] Gait: [facility] [employer] Balance: WNL Functional Measure: NDI: 25/50 NDI: 6/50 TREATMENT/INTERVENTION: 
Modalities (Rationale): to decrease pain and muscle guarding MHP to neck and low back for 15 minutes at end of session; no skin irritation noted E stim to bilateral lower thoracic and lumbar paraspinals with MHP Therapeutic Exercises: HEP: LTR, KTC, Bridges, Chin tucks (supine), UT stretch (B/L)  Shoulder blade squeeze Comprehensive HEP provided 10/5/18 Clinic Activities in BOLD performed this date: UBE/LBE 5 min, resistance of 5 Supine Bridges: 2 sets of 10 reps with 10# BKTC using yellow ball: 20 reps LTR: 10 reps Sidelying lumbar rotation stretch: 2 reps with 30 second holds B Seated UT stretch: 3 reps with 30 sec hold Lower cervical/upper thoracic stretch: 3 reps with 15 second holds Flexion/ ext over back of chair: 10 reps Forward flexion over blue physioball: 10 reps Thoracolumbar side stretch: 5 reps B Trunk rotation stretch: 5 reps B Standing Rows at Quantum 35# 2 sets of 10 reps Pull downs at Quantum 40#: 2 sets of 10 reps Crossover pull downs at 10#: 2 sets of 10 reps X's with green TB: 15 reps Hip extension and abduction with green TB: 2 sets of 10 reps B/L 
HS stretch 20 sec hold  3 reps Trunk extension: 10 reps Work Simulation Pt carried 32# in crate 20 ft, 5 reps All 4's Alt LE: 10 reps B/L Child's pose: 2 reps with 20 sec hold; moderate cues Prone Press ups x 10 reps Neuro Re-Education: 
Educated on proper body mechanics Activity tolerance and post treatment pain report:  
Good Pain Out: 0/10 Education: 
Education was provided to the patient on the following topics: Continue exercises. []    No changes were made to the home exercise program. 
[x]    The following changes were made to the home exercise program: Patient provided with comprehensive HEP and educated on proper body mechanics. Patient verbalized understanding of the topics presented. ASSESSMENT:  
Patient discharged from physical therapy after 13 visits from 8/13/18 to 10/5/18. Patient presents with no pain in neck and minimal pain in mid/low back. Patient reports increased pain with lifting only. Patient educated on proper body mechanics and demonstrates good form. Patient reports no pain increase with any clinic activities this date. No hesitancy during movements. Patient continued to require verbal and tactile cues for proper form with exercises. Increased time spent on form in order to encourage proper technique when performing at home. Significant improvement with NDI outcome measure from 25/50 on evaluation to 6/50 on discharge. Patient has reached maximum benefit from physical therapy at this time. Patient in agreement with discharge. PLAN OF CARE:  
Patient will be discharged from physical therapy at this time. Criteria for termination of care: 
[x]   Goals Achieved 
[]   [address] 
[]   Patient has not returned 
[]   Other Plan for follow up, continuing care, or equipment recommendations: Patient instructed to continue HEP and carry out proper body mechanics with lifting Thank you for this referral. 
 
Esther Velez, PT Time Calculation: 38 mins Patient Time in clinic: 
 Start Time: 1728 Stop Time: 1370

## 2019-07-02 ENCOUNTER — APPOINTMENT (OUTPATIENT)
Dept: GENERAL RADIOLOGY | Age: 44
End: 2019-07-02
Attending: EMERGENCY MEDICINE
Payer: MEDICAID

## 2019-07-02 ENCOUNTER — HOSPITAL ENCOUNTER (EMERGENCY)
Age: 44
Discharge: HOME | End: 2019-07-02
Attending: EMERGENCY MEDICINE
Payer: MEDICAID

## 2019-07-02 VITALS
RESPIRATION RATE: 16 BRPM | DIASTOLIC BLOOD PRESSURE: 77 MMHG | SYSTOLIC BLOOD PRESSURE: 114 MMHG | HEART RATE: 83 BPM | HEIGHT: 66 IN | TEMPERATURE: 98.1 F | WEIGHT: 155 LBS | BODY MASS INDEX: 24.91 KG/M2 | OXYGEN SATURATION: 100 %

## 2019-07-02 DIAGNOSIS — Z76.5 DRUG-SEEKING BEHAVIOR: ICD-10-CM

## 2019-07-02 DIAGNOSIS — Z71.6 ENCOUNTER FOR SMOKING CESSATION COUNSELING: ICD-10-CM

## 2019-07-02 DIAGNOSIS — Z72.0 TOBACCO ABUSE: ICD-10-CM

## 2019-07-02 DIAGNOSIS — M25.561 ACUTE PAIN OF RIGHT KNEE: Primary | ICD-10-CM

## 2019-07-02 DIAGNOSIS — S86.911A KNEE STRAIN, RIGHT, INITIAL ENCOUNTER: ICD-10-CM

## 2019-07-02 PROCEDURE — 96372 THER/PROPH/DIAG INJ SC/IM: CPT

## 2019-07-02 PROCEDURE — 99283 EMERGENCY DEPT VISIT LOW MDM: CPT

## 2019-07-02 PROCEDURE — 73562 X-RAY EXAM OF KNEE 3: CPT

## 2019-07-02 PROCEDURE — 74011250636 HC RX REV CODE- 250/636: Performed by: EMERGENCY MEDICINE

## 2019-07-02 RX ORDER — TRAMADOL HYDROCHLORIDE 50 MG/1
50 TABLET ORAL
Qty: 10 TAB | Refills: 0 | Status: SHIPPED | OUTPATIENT
Start: 2019-07-02 | End: 2019-07-07

## 2019-07-02 RX ORDER — DICLOFENAC SODIUM 10 MG/G
GEL TOPICAL 4 TIMES DAILY
Qty: 1 EACH | Refills: 0 | Status: SHIPPED | OUTPATIENT
Start: 2019-07-02

## 2019-07-02 RX ORDER — KETOROLAC TROMETHAMINE 30 MG/ML
30 INJECTION, SOLUTION INTRAMUSCULAR; INTRAVENOUS
Status: COMPLETED | OUTPATIENT
Start: 2019-07-02 | End: 2019-07-02

## 2019-07-02 RX ADMIN — KETOROLAC TROMETHAMINE 30 MG: 30 INJECTION, SOLUTION INTRAMUSCULAR at 14:50

## 2019-07-02 NOTE — DISCHARGE INSTRUCTIONS

## 2019-07-02 NOTE — ED NOTES
Pt discharged by PA. Pt provided with discharge instructions Rx and instructions on follow up care. Pt out of ED via wheelchair accompanied by this RN.    Transportation called

## 2019-07-03 NOTE — ED PROVIDER NOTES
EMERGENCY DEPARTMENT HISTORY AND PHYSICAL EXAM      Date: 7/2/2019  Patient Name: Jose Angel Valderraam  Patient Age and Sex: 37 y.o. male    History of Presenting Illness     Chief Complaint   Patient presents with    Knee Pain     pt reports right knee pain x4 weeks, was seen at [facility] for pain       History Provided By: Patient    HPI: Jose Angel Valderrama, 37 y.o. male with past medical history as documented below presents to the ED with c/o of four weeks of persistent right knee pain. Pt states he was seen at [facility] a week ago and was discharged home on Tylenol and he states he \"needs something much stronger for pain. Percocet used to work for pain. \" Pt states he noticed onset of right knee pain four weeks ago. He denies trauma or injuries. He states walking makes the pain worst. He denies any swelling, numbness or weakness. He denies any fevers. Pt denies any other alleviating or exacerbating factors. Additionally, pt specifically denies any recent fever, chills, headache, nausea, vomiting, abdominal pain, CP, SOB, lightheadedness, dizziness, numbness, weakness, BLE swelling, heart palpitations, urinary sxs, diarrhea, constipation, melena, hematochezia, cough, or congestion. PCP: Other, MD Nirav    There are no other complaints, changes or physical findings at this time.      Past History   Past Medical History:  Past Medical History:   Diagnosis Date    Bipolar affective ([address]     Depression     Gastrointestinal disorder     hernia, ulcer    GERD (gastroesophageal reflux disease)     Learning disorder     Migraine headache     Umbilical hernia 25/95/4770       Past Surgical History:  Past Surgical History:   Procedure Laterality Date    HX HERNIA REPAIR         Family History:  Family History   Problem Relation Age of Onset    No Known Problems Mother     No Known Problems Father        Social History:  Social History     Tobacco Use    Smoking status: Current Every Day Smoker     Packs/day: 0.25    Smokeless tobacco: Never Used   Substance Use Topics    Alcohol use: No    Drug use: No       Allergies: Allergies   Allergen Reactions    Ibuprofen Itching    Pcn [Penicillins] Itching       Current Medications:  No current facility-administered medications on file prior to encounter. Current Outpatient Medications on File Prior to Encounter   Medication Sig Dispense Refill    lisinopril (PRINIVIL, ZESTRIL) 20 mg tablet take 1 tablet by mouth once daily for high blood pressure 30 Tab 0    atorvastatin (LIPITOR) 40 mg tablet take 1 tablet by mouth once daily for cholesterol 30 Tab 0    atenolol (TENORMIN) 25 mg tablet take 1 tablet by mouth once daily for high blood pressure 30 Tab 0    omeprazole (PRILOSEC) 40 mg capsule take 1 capsule by mouth once daily 30 Cap 0    acetaminophen (TYLENOL ARTHRITIS PAIN) 650 mg CR tablet Take 1 Tab by mouth three (3) times daily as needed for Pain. Indications: ARTHRITIC PAIN 100 Tab 3    hydrOXYzine pamoate (VISTARIL) 25 mg capsule take 1 capsule by mouth three times a day if needed for anxiety 90 Cap 0    PARoxetine (PAXIL) 20 mg tablet Please take half tab,let daily x 5 days and then take 1 daily 30 Tab 5    aspirin delayed-release 81 mg tablet Take 81 mg by mouth daily. 0    VENTOLIN HFA 90 mcg/actuation inhaler inhale 2 puffs by mouth every 6 hours if needed for BREATH OR WHEEZING  0    ipratropium (ATROVENT HFA) 17 mcg/actuation inhaler Take 2 puffs TWICE DAILY, up to four times daily for shortness of breath and wheezing. Indications: BRONCHOSPASM PREVENTION WITH COPD 1 Inhaler 5    ergocalciferol (ERGOCALCIFEROL) 50,000 unit capsule Take 1 Cap by mouth every seven (7) days. Indications: VITAMIN D DEFICIENCY 12 Cap 3       Review of Systems   Review of Systems   Constitutional: Negative. Negative for chills and fever. HENT: Negative. Negative for congestion, facial swelling, rhinorrhea, sore throat, trouble swallowing and voice change.     Eyes: Negative. Respiratory: Negative. Negative for apnea, cough, chest tightness, shortness of breath and wheezing. Cardiovascular: Negative. Negative for chest pain, palpitations and leg swelling. Gastrointestinal: Negative. Negative for abdominal distention, abdominal pain, blood in stool, constipation, diarrhea, nausea and vomiting. Endocrine: Negative. Negative for cold intolerance, heat intolerance and polyuria. Genitourinary: Negative. Negative for difficulty urinating, dysuria, flank pain, frequency, hematuria and urgency. Musculoskeletal: Positive for arthralgias. Negative for back pain, myalgias, neck pain and neck stiffness. Skin: Negative. Negative for color change and rash. Neurological: Negative. Negative for dizziness, syncope, facial asymmetry, speech difficulty, weakness, light-headedness, numbness and headaches. Hematological: Negative. Does not bruise/bleed easily. Psychiatric/Behavioral: Negative. Negative for confusion and self-injury. The patient is not nervous/anxious. Physical Exam   Physical Exam   Constitutional: He is oriented to person, place, and time. Vital signs are normal. He appears well-developed and well-nourished. He is cooperative. Non-toxic appearance. HENT:   Head: Normocephalic and atraumatic. Mouth/Throat: Mucous membranes are normal. No posterior oropharyngeal erythema. Eyes: Pupils are equal, round, and reactive to light. Conjunctivae and EOM are normal.   Neck: Normal range of motion. Cardiovascular: Normal rate, regular rhythm, normal heart sounds and intact distal pulses. Exam reveals no gallop and no friction rub. No murmur heard. Pulmonary/Chest: Effort normal and breath sounds normal. No respiratory distress. He has no wheezes. He has no rales. He exhibits no tenderness. Abdominal: Soft. Bowel sounds are normal. He exhibits no distension and no mass. There is no tenderness. There is no rebound and no guarding. Musculoskeletal: Normal range of motion. He exhibits no edema, tenderness or deformity. Neurological: He is alert and oriented to person, place, and time. He displays normal reflexes. No cranial nerve deficit. He exhibits normal muscle tone. Coordination normal.   Skin: Skin is warm. No rash noted. Psychiatric: He has a normal mood and affect. Nursing note and vitals reviewed. Diagnostic Study Results     Labs -  No results found for this or any previous visit (from the past 24 hour(s)). Radiologic Studies -   XR KNEE RT 3 V   Final Result   IMPRESSION: No acute abnormality. CT Results  (Last 48 hours)    None        CXR Results  (Last 48 hours)    None          Medical Decision Making   I am the first provider for this patient. I reviewed the vital signs, available nursing notes, past medical history, past surgical history, family history and social history. Vital Signs-Reviewed the patient's vital signs. Visit Vitals  /77 (BP 1 Location: Left arm, BP Patient Position: Sitting)   Pulse 83   Temp 98.1 °F (36.7 °C)   Resp 16   Ht 5' 6\" (1.676 m)   Wt 70.3 kg (155 lb)   SpO2 100%   BMI 25.02 kg/m²       Pulse Oximetry Analysis - 100% on RA    Cardiac Monitor:   Rate: 83 bpm  Rhythm: Normal Sinus Rhythm      Records Reviewed: Nursing Notes, Old Medical Records, Previous electrocardiograms, Previous Radiology Studies and Previous Laboratory Studies    Provider Notes (Medical Decision Making):   Patient presents with 4 weeks of right knee pain; DDx: dislocation, fracture, contusion. Will provide ice, analgesics and xrays. Neurovascularly intact on exam. Will reassess. ED Course:   Initial assessment performed. The patients presenting problems have been discussed, and they are in agreement with the care plan formulated and outlined with them. I have encouraged them to ask questions as they arise throughout their visit.     TOBACCO COUNSELING:   Upon evaluation, pt expressed that they are a current tobacco user. For approximately 10 minutes, pt has been counseled on the dangers of smoking and was encouraged to quit as soon as possible in order to decrease further risks to their health. Pt has conveyed their understanding of the risks involved should they continue to use tobacco products. I reviewed our electronic medical record system for any past medical records that were available that may contribute to the patient's current condition, the nursing notes and vital signs from today's visit. Cindy Reddy MD    Medications Administered During ED Course:  Medications   ketorolac (TORADOL) injection 30 mg (30 mg IntraMUSCular Given 7/2/19 1450)     Procedure Note - Ace Wrap Placement:  Performed by: Jerel Lyman MD  Neurovascularly intact prior to tx. An ace wrap was placed on pt's right knee. Joint was placed in neutral position. Neurovascularly intact after tx. The procedure took 1-15 minutes, and pt tolerated well. Progress Note:  Patient has been reassessed and reports feeling better and symptoms have improved after ED treatment. Edward Tijerina is able to tolerate PO and ambulate per baseline. Rommel Damico's final labs and imaging have been reviewed with him. He has been counseled regarding his diagnosis. He verbally conveys understanding and agreement of the signs, symptoms, diagnosis, treatment and prognosis and additionally agrees to follow up as recommended with Nirav Cardenas MD in 24 - 48 hours. He also agrees with the care-plan and conveys that all of his questions have been answered. I have also put together some discharge instructions for him that include: 1) educational information regarding their diagnosis, 2) how to care for their diagnosis at home, as well a 3) list of reasons why they would want to return to the ED prior to their follow-up appointment, should their condition change. I have answered all questions to the patient's satisfaction.  Strict return precautions given. He both understood and agreed with plan as discussed above. Vital signs stable for discharge. Disposition: DISCHARGE     The pt is ready for discharge. The pt's signs, symptoms, diagnosis, and discharge instructions have been discussed and pt has conveyed their understanding. The pt is to follow up as recommended or return to ER should their symptoms worsen. Plan has been discussed and pt is in agreement. PLAN:  1. Discharge Medication List as of 7/2/2019  3:21 PM      CONTINUE these medications which have NOT CHANGED    Details   lisinopril (PRINIVIL, ZESTRIL) 20 mg tablet take 1 tablet by mouth once daily for high blood pressure, Normal, Disp-30 Tab, R-0      atorvastatin (LIPITOR) 40 mg tablet take 1 tablet by mouth once daily for cholesterol, Normal, Disp-30 Tab, R-0      atenolol (TENORMIN) 25 mg tablet take 1 tablet by mouth once daily for high blood pressure, Normal, Disp-30 Tab, R-0      omeprazole (PRILOSEC) 40 mg capsule take 1 capsule by mouth once daily, Normal, Disp-30 Cap, R-0      acetaminophen (TYLENOL ARTHRITIS PAIN) 650 mg CR tablet Take 1 Tab by mouth three (3) times daily as needed for Pain. Indications: ARTHRITIC PAIN, Normal, Disp-100 Tab, R-3      hydrOXYzine pamoate (VISTARIL) 25 mg capsule take 1 capsule by mouth three times a day if needed for anxiety, Normal, Disp-90 Cap, R-0      PARoxetine (PAXIL) 20 mg tablet Please take half tab,let daily x 5 days and then take 1 daily, Normal, Disp-30 Tab, R-5      aspirin delayed-release 81 mg tablet Take 81 mg by mouth daily. , Historical Med, R-0      VENTOLIN HFA 90 mcg/actuation inhaler inhale 2 puffs by mouth every 6 hours if needed for BREATH OR WHEEZING, Historical Med, R-0, DUC      ipratropium (ATROVENT HFA) 17 mcg/actuation inhaler Take 2 puffs TWICE DAILY, up to four times daily for shortness of breath and wheezing.    Indications: BRONCHOSPASM PREVENTION WITH COPD, No Print, Disp-1 Inhaler, R-5 ergocalciferol (ERGOCALCIFEROL) 50,000 unit capsule Take 1 Cap by mouth every seven (7) days. Indications: VITAMIN D DEFICIENCY, Normal, Disp-12 Cap, R-3           2. Follow-up Information     Follow up With Specialties Details Why Contact Info    Other, MD Nirav    Patient can only remember the practice name and not the physician      [facility] EMERGENCY DEPT Emergency Medicine  As needed, If symptoms worsen [address]  [address] [facility]  [phone]          Return to ED if worse  Diagnosis     Clinical Impression:   1. Acute pain of right knee    2. Knee strain, right, initial encounter    3. Drug-seeking behavior    4. Tobacco abuse    5. Encounter for smoking cessation counseling        Attestation:  I personally performed the services described in this documentation on this date 7/2/2019 for patient, Cliff Sen. Bernarda Bowden MD    Please note that this dictation was completed with [employer], the computer voice recognition software. Quite often unanticipated grammatical, syntax, homophones, and other interpretive errors are inadvertently transcribed by the computer software. Please disregard these errors. Please excuse any errors that have escaped final proofreading. This note will not be viewable in [address]

## 2022-02-28 ENCOUNTER — HOSPITAL ENCOUNTER (EMERGENCY)
Age: 47
Discharge: HOME | End: 2022-02-28
Attending: EMERGENCY MEDICINE
Payer: MEDICAID

## 2022-02-28 VITALS
DIASTOLIC BLOOD PRESSURE: 96 MMHG | BODY MASS INDEX: 25.11 KG/M2 | TEMPERATURE: 98.3 F | RESPIRATION RATE: 16 BRPM | SYSTOLIC BLOOD PRESSURE: 127 MMHG | WEIGHT: 160 LBS | OXYGEN SATURATION: 97 % | HEIGHT: 67 IN | HEART RATE: 94 BPM

## 2022-02-28 DIAGNOSIS — B34.9 VIRAL ILLNESS: Primary | ICD-10-CM

## 2022-02-28 DIAGNOSIS — R11.0 NAUSEA WITHOUT VOMITING: ICD-10-CM

## 2022-02-28 PROCEDURE — U0005 INFEC AGEN DETEC AMPLI PROBE: HCPCS

## 2022-02-28 PROCEDURE — 99283 EMERGENCY DEPT VISIT LOW MDM: CPT

## 2022-02-28 PROCEDURE — 74011250637 HC RX REV CODE- 250/637: Performed by: PHYSICIAN ASSISTANT

## 2022-02-28 RX ORDER — ONDANSETRON 4 MG/1
4 TABLET, ORALLY DISINTEGRATING ORAL
Qty: 10 TABLET | Refills: 0 | Status: SHIPPED | OUTPATIENT
Start: 2022-02-28

## 2022-02-28 RX ORDER — ONDANSETRON 4 MG/1
4 TABLET, ORALLY DISINTEGRATING ORAL
Status: COMPLETED | OUTPATIENT
Start: 2022-02-28 | End: 2022-02-28

## 2022-02-28 RX ADMIN — ONDANSETRON 4 MG: 4 TABLET, ORALLY DISINTEGRATING ORAL at 14:12

## 2022-02-28 NOTE — ED TRIAGE NOTES
Cc fatigue, body aches, headache, nasal congestion, and cough that started last night. Reports his girlfriend has also been sick in the home. Denies covid vaccination. Everyday smoker. Reports a hx of htn and dyslipidemia. Spoke with Ghassan @ answering service

## 2022-02-28 NOTE — Clinical Note
[facility] EMERGENCY DEPT  [address] [address] [phone]    Work/School Note    Date: 2/28/2022     To Whom It May concern:    Fer Mosqueda was evaluated by the following provider(s):  Attending Provider: Herb Polanco MD  Physician Assistant: Dwight Bowen virus is suspected. Per the CDC guidelines we recommend home isolation until the following conditions are all met:    1. At least five days have passed since symptoms first appeared and/or had a close exposure,   2. After home isolation for five days, wearing a mask around others for the next five days,  3. At least 24 have passed since last fever without the use of fever-reducing medications and  4.  Symptoms (eg cough, shortness of breath) have improved      Sincerely,          Concha Canchola PA-C

## 2022-02-28 NOTE — ED PROVIDER NOTES
EMERGENCY DEPARTMENT HISTORY AND PHYSICAL EXAM    Date: 2/28/2022  Patient Name: Dakota Alas    History of Presenting Illness     Chief Complaint   Patient presents with    Flu Like Symptoms         History Provided By: Patient    HPI: Dakota Alas is a 48 y.o. male with a PMH of GERD, migraine, depression, bipolar, hypertension, high cholesterol, learning disorder, hernia who presents with complaints of feeling \"weak, body aches and nausea\" that started today. Patient states his girlfriend has also had similar symptoms. Patient denies history of Covid vaccination. Patient is a daily smoker. He rates discomfort 9 out of 10. PCP: Nirav Jolly MD    Current Outpatient Medications   Medication Sig Dispense Refill    ondansetron (ZOFRAN ODT) 4 mg disintegrating tablet Take 1 Tablet by mouth every eight (8) hours as needed for Nausea or Vomiting. 10 Tablet 0    diclofenac (VOLTAREN) 1 % gel Apply  to affected area four (4) times daily. 1 Each 0    hydrOXYzine pamoate (VISTARIL) 25 mg capsule take 1 capsule by mouth three times a day if needed for anxiety 90 Cap 0    PARoxetine (PAXIL) 20 mg tablet Please take half tab,let daily x 5 days and then take 1 daily 30 Tab 5    aspirin delayed-release 81 mg tablet Take 81 mg by mouth daily. 0    lisinopril (PRINIVIL, ZESTRIL) 20 mg tablet take 1 tablet by mouth once daily for high blood pressure 30 Tab 0    atorvastatin (LIPITOR) 40 mg tablet take 1 tablet by mouth once daily for cholesterol 30 Tab 0    atenolol (TENORMIN) 25 mg tablet take 1 tablet by mouth once daily for high blood pressure 30 Tab 0    omeprazole (PRILOSEC) 40 mg capsule take 1 capsule by mouth once daily 30 Cap 0    VENTOLIN HFA 90 mcg/actuation inhaler inhale 2 puffs by mouth every 6 hours if needed for BREATH OR WHEEZING  0    acetaminophen (TYLENOL ARTHRITIS PAIN) 650 mg CR tablet Take 1 Tab by mouth three (3) times daily as needed for Pain.  Indications: ARTHRITIC PAIN 100 Tab 3    ipratropium (ATROVENT HFA) 17 mcg/actuation inhaler Take 2 puffs TWICE DAILY, up to four times daily for shortness of breath and wheezing. Indications: BRONCHOSPASM PREVENTION WITH COPD 1 Inhaler 5    ergocalciferol (ERGOCALCIFEROL) 50,000 unit capsule Take 1 Cap by mouth every seven (7) days. Indications: VITAMIN D DEFICIENCY 12 Cap 3       Past History     Past Medical History:  Past Medical History:   Diagnosis Date    Bipolar affective ([address]     Depression     Gastrointestinal disorder     hernia, ulcer    GERD (gastroesophageal reflux disease)     Learning disorder     Migraine headache     Umbilical hernia 82/42/3763       Past Surgical History:  Past Surgical History:   Procedure Laterality Date    HX HERNIA REPAIR         Family History:  Family History   Problem Relation Age of Onset    No Known Problems Mother     No Known Problems Father        Social History:  Social History     Tobacco Use    Smoking status: Current Every Day Smoker     Packs/day: 0.25    Smokeless tobacco: Never Used   Substance Use Topics    Alcohol use: No    Drug use: No       Allergies: Allergies   Allergen Reactions    Ibuprofen Itching    Pcn [Penicillins] Itching         Review of Systems   Review of Systems   Constitutional: Positive for appetite change and fatigue. Respiratory: Negative for shortness of breath and wheezing. Gastrointestinal: Positive for nausea. Negative for abdominal pain and vomiting. Musculoskeletal: Positive for myalgias. Neurological: Positive for weakness. Negative for speech difficulty. All other systems reviewed and are negative. Physical Exam     Vitals:    02/28/22 1343   BP: (!) 127/96   Pulse: 94   Resp: 16   Temp: 98.3 °F (36.8 °C)   SpO2: 97%   Weight: 72.6 kg (160 lb)   Height: 5' 7\" (1.702 m)     Physical Exam  Vitals and nursing note reviewed. Constitutional:       General: He is not in acute distress. Appearance: He is well-developed. HENT:      Head: Normocephalic and atraumatic. Mouth/Throat:      Pharynx: No oropharyngeal exudate. Eyes:      Conjunctiva/sclera: Conjunctivae normal.   Cardiovascular:      Rate and Rhythm: Normal rate and regular rhythm. Heart sounds: Normal heart sounds. Pulmonary:      Effort: Pulmonary effort is normal. No respiratory distress. Breath sounds: Normal breath sounds. No wheezing or rales. Musculoskeletal:         General: Normal range of motion. Skin:     General: Skin is warm and dry. Neurological:      Mental Status: He is alert and oriented to person, place, and time. Diagnostic Study Results     Labs -   No results found for this or any previous visit (from the past 12 hour(s)). Radiologic Studies -   No orders to display     CT Results  (Last 48 hours)    None        CXR Results  (Last 48 hours)    None            Medical Decision Making   I am the first provider for this patient. I reviewed the vital signs, available nursing notes, past medical history, past surgical history, family history and social history. Vital Signs-Reviewed the patient's vital signs. Records Reviewed: Nursing Notes and Old Medical Records    Provider Notes (Medical Decision Making):   Patient presents with symptoms of fatigue,nausea, and body aches. DDx: Influenza, URI, COVID, stress. Will get COVID swab and give antimetic, low suspicion for flu      ED Course as of 02/28/22 1453   Mon Feb 28, 2022   1450 Pt reexamined, resting comfortably in NAD. Will plan for D/C [AH]      ED Course User Index  [AH] Danika Naqvi PA-C          Disposition:  Discharged    DISCHARGE NOTE:   2:52 PM      Care plan outlined and precautions discussed. Patient has no new complaints, changes, or physical findings. All medications were reviewed with the patient; will d/c home. All of pt's questions and concerns were addressed.  Patient was instructed and agrees to follow up with PCP prn, as well as to return to the ED upon further deterioration. Patient is ready to go home. Follow-up Information     Follow up With Specialties Details [address]  [facility] [place], [address] [facility] [phone]  [address]          Current Discharge Medication List      START taking these medications    Details   ondansetron (ZOFRAN ODT) 4 mg disintegrating tablet Take 1 Tablet by mouth every eight (8) hours as needed for Nausea or Vomiting. Qty: 10 Tablet, Refills: 0  Start date: 2/28/2022             Procedures:  Procedures    Please note that this dictation was completed with Dragon, computer voice recognition software. Quite often unanticipated grammatical, syntax, homophones, and other interpretive errors are inadvertently transcribed by the computer software. Please disregard these errors. Additionally, please excuse any errors that have escaped final proofreading. Diagnosis     Clinical Impression:   1. Viral illness    2.  Nausea without vomiting

## 2022-03-01 ENCOUNTER — PATIENT OUTREACH (OUTPATIENT)
Dept: CASE MANAGEMENT | Age: 47
End: 2022-03-01

## 2022-03-01 LAB
SARS-COV-2, XPLCVT: NOT DETECTED
SOURCE, COVRS: NORMAL

## 2022-03-01 NOTE — PROGRESS NOTES
Patient contacted regarding COVID-19 risk. Discussed COVID-19 related testing which was pending at this time. Test results were pending. Patient informed of results, if available? no.     LPN Care Coordinator contacted the patient by telephone to perform post discharge assessment. Call within 2 business days of discharge: Yes Verified name and  with patient as identifiers. Provided introduction to self, and explanation of the CTN/ACM role, and reason for call due to risk factors for infection and/or exposure to COVID-19. Symptoms reviewed with patient who verbalized the following symptoms: no new symptoms and no worsening symptoms      Due to no new or worsening symptoms encounter was not routed to provider for escalation. Discussed follow-up appointments. If no appointment was previously scheduled, appointment scheduling offered:  no. [facility] follow up appointment(s): No future appointments. Non-[facility] follow up appointment(s): NA    Interventions to address risk factors: Reviewed and followed up on pending diagnostic tests and treatments-covid test pending     Advance Care Planning:   Does patient have an Advance Directive: decision makers updated. Educated patient about risk for severe COVID-19 due to risk factors according to CDC guidelines. LPN CC reviewed discharge instructions, medical action plan and red flag symptoms with the patient who verbalized understanding. Discussed COVID vaccination status: no. Education provided on COVID-19 vaccination as appropriate. Discussed exposure protocols and quarantine with CDC Guidelines. Patient was given an opportunity to verbalize any questions and concerns and agrees to contact LPN CC or health care provider for questions related to their healthcare. Reviewed and educated patient on any new and changed medications related to discharge diagnosis     Was patient discharged with a pulse oximeter? no.    LPN CC provided contact information.  Plan for follow-up call in 5-7 days based on severity of symptoms and risk factors.

## 2022-03-08 ENCOUNTER — PATIENT OUTREACH (OUTPATIENT)
Dept: CASE MANAGEMENT | Age: 47
End: 2022-03-08

## 2022-03-08 NOTE — PROGRESS NOTES
Patient resolved from [address] Transitions episode on 3/8/22. Discussed COVID-19 related testing which was available at this time. Test results were negative. Patient informed of results, if available? no     Patient/family has been provided the following resources and education related to COVID-19:                         Signs, symptoms and red flags related to COVID-19            [facility] exposure and quarantine guidelines            [employer] exposure contact - [phone]            Contact for their local Department of Health                 Patient currently reports that the following symptoms have improved:  no new symptoms and no worsening symptoms. No further outreach scheduled with this CTN/ACM/LPN/HC/ MA. Episode of Care resolved. Patient has this CTN/ACM/LPN/HC/MA contact information if future needs arise.

## 2023-10-19 ENCOUNTER — HOSPITAL ENCOUNTER (EMERGENCY)
Facility: HOSPITAL | Age: 48
Discharge: HOME | End: 2023-10-19
Attending: STUDENT IN AN ORGANIZED HEALTH CARE EDUCATION/TRAINING PROGRAM
Payer: MEDICAID

## 2023-10-19 VITALS
SYSTOLIC BLOOD PRESSURE: 107 MMHG | TEMPERATURE: 98.3 F | HEART RATE: 90 BPM | RESPIRATION RATE: 15 BRPM | DIASTOLIC BLOOD PRESSURE: 79 MMHG | HEIGHT: 67 IN | WEIGHT: 150 LBS | OXYGEN SATURATION: 98 % | BODY MASS INDEX: 23.54 KG/M2

## 2023-10-19 DIAGNOSIS — E87.6 HYPOKALEMIA: ICD-10-CM

## 2023-10-19 DIAGNOSIS — R56.9 SEIZURE (HCC): Primary | ICD-10-CM

## 2023-10-19 LAB
ALBUMIN SERPL-MCNC: 3.7 G/DL (ref 3.5–5)
ALBUMIN/GLOB SERPL: 0.8 (ref 1.1–2.2)
ALP SERPL-CCNC: 111 U/L (ref 45–117)
ALT SERPL-CCNC: 18 U/L (ref 12–78)
ANION GAP SERPL CALC-SCNC: 6 MMOL/L (ref 5–15)
AST SERPL-CCNC: 27 U/L (ref 15–37)
BASOPHILS # BLD: 0.1 K/UL (ref 0–0.1)
BASOPHILS NFR BLD: 1 % (ref 0–1)
BILIRUB SERPL-MCNC: 0.2 MG/DL (ref 0.2–1)
BUN SERPL-MCNC: 7 MG/DL (ref 6–20)
BUN/CREAT SERPL: 6 (ref 12–20)
CALCIUM SERPL-MCNC: 9.4 MG/DL (ref 8.5–10.1)
CHLORIDE SERPL-SCNC: 98 MMOL/L (ref 97–108)
CO2 SERPL-SCNC: 35 MMOL/L (ref 21–32)
CREAT SERPL-MCNC: 1.16 MG/DL (ref 0.7–1.3)
DIFFERENTIAL METHOD BLD: ABNORMAL
EOSINOPHIL # BLD: 0.3 K/UL (ref 0–0.4)
EOSINOPHIL NFR BLD: 4 % (ref 0–7)
ERYTHROCYTE [DISTWIDTH] IN BLOOD BY AUTOMATED COUNT: 17.3 % (ref 11.5–14.5)
GLOBULIN SER CALC-MCNC: 4.7 G/DL (ref 2–4)
GLUCOSE SERPL-MCNC: 97 MG/DL (ref 65–100)
HCT VFR BLD AUTO: 38.3 % (ref 36.6–50.3)
HGB BLD-MCNC: 12.1 G/DL (ref 12.1–17)
IMM GRANULOCYTES # BLD AUTO: 0 K/UL (ref 0–0.04)
IMM GRANULOCYTES NFR BLD AUTO: 1 % (ref 0–0.5)
LYMPHOCYTES # BLD: 2.5 K/UL (ref 0.8–3.5)
LYMPHOCYTES NFR BLD: 32 % (ref 12–49)
MAGNESIUM SERPL-MCNC: 1.9 MG/DL (ref 1.6–2.4)
MCH RBC QN AUTO: 22.4 PG (ref 26–34)
MCHC RBC AUTO-ENTMCNC: 31.6 G/DL (ref 30–36.5)
MCV RBC AUTO: 71.1 FL (ref 80–99)
MONOCYTES # BLD: 0.5 K/UL (ref 0–1)
MONOCYTES NFR BLD: 7 % (ref 5–13)
NEUTS SEG # BLD: 4.3 K/UL (ref 1.8–8)
NEUTS SEG NFR BLD: 55 % (ref 32–75)
NRBC # BLD: 0 K/UL (ref 0–0.01)
NRBC BLD-RTO: 0 PER 100 WBC
PLATELET # BLD AUTO: 460 K/UL (ref 150–400)
PMV BLD AUTO: 8.3 FL (ref 8.9–12.9)
POTASSIUM SERPL-SCNC: 2.6 MMOL/L (ref 3.5–5.1)
PROT SERPL-MCNC: 8.4 G/DL (ref 6.4–8.2)
RBC # BLD AUTO: 5.39 M/UL (ref 4.1–5.7)
SODIUM SERPL-SCNC: 139 MMOL/L (ref 136–145)
WBC # BLD AUTO: 7.8 K/UL (ref 4.1–11.1)

## 2023-10-19 PROCEDURE — 85025 COMPLETE CBC W/AUTO DIFF WBC: CPT

## 2023-10-19 PROCEDURE — 96365 THER/PROPH/DIAG IV INF INIT: CPT

## 2023-10-19 PROCEDURE — 6360000002 HC RX W HCPCS: Performed by: STUDENT IN AN ORGANIZED HEALTH CARE EDUCATION/TRAINING PROGRAM

## 2023-10-19 PROCEDURE — 96366 THER/PROPH/DIAG IV INF ADDON: CPT

## 2023-10-19 PROCEDURE — 6370000000 HC RX 637 (ALT 250 FOR IP): Performed by: STUDENT IN AN ORGANIZED HEALTH CARE EDUCATION/TRAINING PROGRAM

## 2023-10-19 PROCEDURE — 36415 COLL VENOUS BLD VENIPUNCTURE: CPT

## 2023-10-19 PROCEDURE — 80053 COMPREHEN METABOLIC PANEL: CPT

## 2023-10-19 PROCEDURE — 93005 ELECTROCARDIOGRAM TRACING: CPT | Performed by: STUDENT IN AN ORGANIZED HEALTH CARE EDUCATION/TRAINING PROGRAM

## 2023-10-19 PROCEDURE — 83735 ASSAY OF MAGNESIUM: CPT

## 2023-10-19 PROCEDURE — 99284 EMERGENCY DEPT VISIT MOD MDM: CPT

## 2023-10-19 RX ORDER — POTASSIUM CHLORIDE 20 MEQ/1
20 TABLET, EXTENDED RELEASE ORAL DAILY
Qty: 7 TABLET | Refills: 0 | Status: SHIPPED | OUTPATIENT
Start: 2023-10-19 | End: 2023-10-26

## 2023-10-19 RX ORDER — POTASSIUM CHLORIDE 750 MG/1
40 TABLET, FILM COATED, EXTENDED RELEASE ORAL ONCE
Status: COMPLETED | OUTPATIENT
Start: 2023-10-19 | End: 2023-10-19

## 2023-10-19 RX ORDER — POTASSIUM CHLORIDE 7.45 MG/ML
10 INJECTION INTRAVENOUS
Status: COMPLETED | OUTPATIENT
Start: 2023-10-19 | End: 2023-10-19

## 2023-10-19 RX ADMIN — POTASSIUM CHLORIDE 40 MEQ: 750 TABLET, FILM COATED, EXTENDED RELEASE ORAL at 13:17

## 2023-10-19 RX ADMIN — POTASSIUM CHLORIDE 10 MEQ: 7.46 INJECTION, SOLUTION INTRAVENOUS at 14:43

## 2023-10-19 RX ADMIN — POTASSIUM CHLORIDE 10 MEQ: 7.46 INJECTION, SOLUTION INTRAVENOUS at 13:08

## 2023-10-19 ASSESSMENT — PAIN SCALES - GENERAL: PAINLEVEL_OUTOF10: 0
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
## 2023-10-19 NOTE — ED NOTES
Discharge instructions given to patient by MD and RN. Pt has been given counseling on medication use and verbalizes understanding. IV D/C. Pt ambulated off of unit in no signs of distress.        Cecy Gauthier RN  10/19/23 9738
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
## 2023-10-20 LAB
EKG ATRIAL RATE: 79 BPM
EKG DIAGNOSIS: NORMAL
EKG P AXIS: 38 DEGREES
EKG P-R INTERVAL: 184 MS
EKG Q-T INTERVAL: 404 MS
EKG QRS DURATION: 86 MS
EKG QTC CALCULATION (BAZETT): 463 MS
EKG R AXIS: 61 DEGREES
EKG T AXIS: 13 DEGREES
EKG VENTRICULAR RATE: 79 BPM

## 2023-10-20 PROCEDURE — 93010 ELECTROCARDIOGRAM REPORT: CPT | Performed by: SPECIALIST

## 2024-05-15 ENCOUNTER — HOSPITAL ENCOUNTER (OUTPATIENT)
Facility: HOSPITAL | Age: 49
Setting detail: RECURRING SERIES
Discharge: HOME | End: 2024-05-18
Payer: MEDICAID

## 2024-05-15 PROCEDURE — 97110 THERAPEUTIC EXERCISES: CPT | Performed by: PHYSICAL THERAPIST

## 2024-05-15 PROCEDURE — 97161 PT EVAL LOW COMPLEX 20 MIN: CPT | Performed by: PHYSICAL THERAPIST

## 2024-05-15 NOTE — PROGRESS NOTES
affection function, impaired gait/balance, decrease ADL/functional abilities, decrease activity tolerance, and decrease flexibility/joint mobility   Treatment Plan may include any combination of the followin Therapeutic Exercise, 42914 Neuromuscular Re-Education, 99445 Manual Therapy, 58785 Therapeutic Activity, 94803 Electrical Stim unattended, 55421 Gait Training, and 12405 Ultrasound  Patient / Family readiness to learn indicated by: interest and return verbalization   Persons(s) to be included in education: patient (P)  Barriers to Learning/Limitations: none  Patient Self Reported Health Status: good  Rehabilitation Potential: good    Short Term Goals: To be accomplished in 6 treatments.   Patient will be independent with initial HEP in order to transition to general wellness program.  Patient will report ceasing use of L wrist brace for ADLs    Patient will report worst pain level of 6/10 or better to allow for a minimum of 6 hours of interrupted sleeping ability.  The patient will demonstrate wrist flexion AROM to 70 degrees or greater to improve ease in carrying items.    Long Term Goals: To be accomplished in 12 treatments.   1.Patient will report worst pain no greater than 3/10 to increase to allow for independence with daily ambulation   2.Patient will demonstrate 5/5 BUE strength to allow for improved lifting tolerance  3.Patient will demonstrate pain free lumbar AROM WFL to improve ease with functional lifting and transitional movement tasks  4.Patient will show improve in NOAH score by 11 points to indicate a significant improvement in symptoms      Frequency / Duration: Patient to be seen 1-2 times per week for 12 treatments or until 7/10/24.     Patient/ Caregiver education and instruction: Diagnosis, prognosis, exercises   [x]  Plan of care has been reviewed with KAILEY Steel, PT       5/15/2024       1:04

## 2024-05-20 ENCOUNTER — HOSPITAL ENCOUNTER (OUTPATIENT)
Facility: HOSPITAL | Age: 49
Setting detail: RECURRING SERIES
End: 2024-05-20
Payer: MEDICAID

## 2024-05-20 NOTE — PROGRESS NOTES
[place] [address] [place] [address]  [id number]    OUTPATIENT PHYSICAL THERAPY      5/20/2024:  Sean PERFECTO August was not seen on this date for physical therapy for the following reasons:    [x]     Patient called to cancel the visit for the following reasons: transportation issue  []     Patient missed the visit and did not call to cancel.    Cisco Steel, PT

## 2024-05-24 ENCOUNTER — HOSPITAL ENCOUNTER (OUTPATIENT)
Facility: HOSPITAL | Age: 49
Setting detail: RECURRING SERIES
Discharge: HOME | End: 2024-05-27
Payer: MEDICAID

## 2024-05-24 PROCEDURE — 97110 THERAPEUTIC EXERCISES: CPT

## 2024-05-24 NOTE — PROGRESS NOTES
tolerance  3.Patient will demonstrate pain free lumbar AROM WFL to improve ease with functional lifting and transitional movement tasks  4.Patient will show improve in NOAH score by 11 points to indicate a significant improvement in symptoms      PLAN  Yes  Continue plan of care  Re-Cert Due: After 12 visits  [x]  Upgrade activities as tolerated  []  Discharge due to:  []  Other:      Orville Bettencourt, PT       5/24/2024       1:28 PM

## 2024-06-05 ENCOUNTER — HOSPITAL ENCOUNTER (OUTPATIENT)
Facility: HOSPITAL | Age: 49
Setting detail: RECURRING SERIES
Discharge: HOME | End: 2024-06-08
Payer: MEDICAID

## 2024-06-05 PROCEDURE — 97110 THERAPEUTIC EXERCISES: CPT

## 2024-06-05 PROCEDURE — 97530 THERAPEUTIC ACTIVITIES: CPT

## 2024-06-05 NOTE — PROGRESS NOTES
movement tasks  4.Patient will show improve in NOAH score by 11 points to indicate a significant improvement in symptoms      PLAN  Yes  Continue plan of care  Re-Cert Due: After 12 visits  [x]  Upgrade activities as tolerated  []  Discharge due to:  []  Other:      Orville Bettencourt, PT       6/5/2024       2:21 PM

## 2024-06-12 ENCOUNTER — HOSPITAL ENCOUNTER (OUTPATIENT)
Facility: HOSPITAL | Age: 49
Setting detail: RECURRING SERIES
Discharge: HOME | End: 2024-06-15
Payer: MEDICAID

## 2024-06-12 PROCEDURE — 97530 THERAPEUTIC ACTIVITIES: CPT

## 2024-06-12 NOTE — PROGRESS NOTES
PHYSICAL THERAPY - DAILY TREATMENT NOTE (updated 3/23)      Date: 2024          Patient Name:  Sean August :  1975   Medical   Diagnosis:  Other low back pain [M54.59]  Pain in right arm [M79.601]  Pain in left arm [M79.602]  Pain in left leg [M79.605]  Pain in right leg [M79.604] Treatment Diagnosis:  M79.662  Pain in left lower leg , M79.632  Pain in left forearm , and M54.59  OTHER LOWER BACK PAIN    Referral Source:  Pat Vincent PA-C Insurance:   [facility] MEDICA[facility] / Plan: [facility] [employer] [facility] CARDINAL CARE / Product Type: *No Product type* /                     Patient  verified yes     Visit #   Current  / Total 4 12   Time   In / Out 1400 1415   Total Treatment Time 15   Total Timed Codes 1         SUBJECTIVE    Pain Level (0-10 scale): 4/10 in L arm, back improved, 4/10 in back    Any medication changes, allergies to medications, adverse drug reactions, diagnosis change, or new procedure performed?: [x] No    [] Yes (see summary sheet for update)  Medications: Verified on Patient Summary List    Subjective functional status/changes:     \"I've been working out the whole week. I've been walking, doing pushups, situps, leg lifts and stuff.\"    OBJECTIVE      Therapeutic Procedures:  Tx Min Billable or 1:1 Min (if diff from Tx Min) Procedure, Rationale, Specifics   15  17197 Therapeutic Exercise (timed):  increase ROM, strength, coordination, balance, and proprioception to improve patient's ability to progress to PLOF and address remaining functional goals. (see flow sheet as applicable)     Details if applicable:    Access Code: [email or web address]  Exercises  - Seated Lumbar Flexion Stretch  - 1-2 x daily - 7 x weekly - 10 reps  - Standing Lumbar Extension  - 1-2 x daily - 7 x weekly - 10 reps  - X Band Walk  - 7 x weekly - 10 reps  - Modified Jesus Stretch  - 7 x weekly - 1 reps - 2min hold  - Seated Hamstring Stretch  - 7 x weekly - 1 reps - 1min hold  - Bird Dog  - 7 x weekly - 15

## 2024-06-19 ENCOUNTER — HOSPITAL ENCOUNTER (OUTPATIENT)
Facility: HOSPITAL | Age: 49
Setting detail: RECURRING SERIES
Discharge: HOME | End: 2024-06-22
Payer: MEDICAID

## 2024-06-19 PROCEDURE — 97110 THERAPEUTIC EXERCISES: CPT

## 2024-06-19 NOTE — PROGRESS NOTES
PHYSICAL THERAPY - DAILY TREATMENT NOTE (updated 3/23)      Date: 2024          Patient Name:  Sean August :  1975   Medical   Diagnosis:  Other low back pain [M54.59]  Pain in right arm [M79.601]  Pain in left arm [M79.602]  Pain in left leg [M79.605]  Pain in right leg [M79.604] Treatment Diagnosis:  M79.662  Pain in left lower leg , M79.632  Pain in left forearm , and M54.59  OTHER LOWER BACK PAIN    Referral Source:  Pat Vincent PA-C Insurance:   [facility]: [facility] MEDICAID / Plan: [facility] [employer] CARDINAL CARE / Product Type: *No Product type* /                     Patient  verified yes     Visit #   Current  / Total 5 12   Time   In / Out 1430    Total Treatment Time    Total Timed Codes          SUBJECTIVE    Pain Level (0-10 scale): some pain in neck and shoulders, moving improves, knees hurting today    Any medication changes, allergies to medications, adverse drug reactions, diagnosis change, or new procedure performed?: [x] No    [] Yes (see summary sheet for update)  Medications: Verified on Patient Summary List    Subjective functional status/changes:     \"I've been working out the whole week. I've been walking, doing pushups, situps, leg lifts and stuff.\"    OBJECTIVE      Therapeutic Procedures:  Tx Min Billable or 1:1 Min (if diff from Tx Min) Procedure, Rationale, Specifics   15  49453 Therapeutic Exercise (timed):  increase ROM, strength, coordination, balance, and proprioception to improve patient's ability to progress to PLOF and address remaining functional goals. (see flow sheet as applicable)     Details if applicable:    Access Code: [email or web address]  Exercises  - Seated Lumbar Flexion Stretch  - 1-2 x daily - 7 x weekly - 10 reps  - Standing Lumbar Extension  - 1-2 x daily - 7 x weekly - 10 reps  - X Band Walk  - 7 x weekly - 10 reps  - Modified Jesus Stretch  - 7 x weekly - 1 reps - 2min hold  - Seated Hamstring Stretch  - 7 x weekly - 1 reps - 1min hold  - Bird Dog

## 2024-06-26 ENCOUNTER — HOSPITAL ENCOUNTER (OUTPATIENT)
Facility: HOSPITAL | Age: 49
Setting detail: RECURRING SERIES
Discharge: HOME | End: 2024-06-29
Payer: MEDICAID

## 2024-06-26 PROCEDURE — 97110 THERAPEUTIC EXERCISES: CPT

## 2024-06-26 NOTE — PROGRESS NOTES
PHYSICAL THERAPY - DAILY TREATMENT NOTE (updated 3/23)      Date: 2024          Patient Name:  Sean August :  1975   Medical   Diagnosis:  Other low back pain [M54.59]  Pain in right arm [M79.601]  Pain in left arm [M79.602]  Pain in left leg [M79.605]  Pain in right leg [M79.604] Treatment Diagnosis:  M79.662  Pain in left lower leg , M79.632  Pain in left forearm , and M54.59  OTHER LOWER BACK PAIN    Referral Source:  Pat Vincent PA-C Insurance:   [facility]ICA[facility] / Plan: [facility] [email or web address] [facility] CARDINAL CARE / Product Type: *No Product type* /                     Patient  verified yes     Visit #   Current  / Total 7 12   Time   In / Out 1430 1500   Total Treatment Time 30   Total Timed Codes 2         SUBJECTIVE    Pain Level (0-10 scale): some pain in neck and shoulders, moving improves, knees hurting today    Any medication changes, allergies to medications, adverse drug reactions, diagnosis change, or new procedure performed?: [x] No    [] Yes (see summary sheet for update)  Medications: Verified on Patient Summary List    Subjective functional status/changes:     \"I've been working out a lot this week, my knees feel better. My back mostly bothers me at night time.\"    OBJECTIVE      Therapeutic Procedures:  Tx Min Billable or 1:1 Min (if diff from Tx Min) Procedure, Rationale, Specifics   30  40877 Therapeutic Exercise (timed):  increase ROM, strength, coordination, balance, and proprioception to improve patient's ability to progress to PLOF and address remaining functional goals. (see flow sheet as applicable)     Details if applicable:    Access Code: [email or web address]  Exercises  - Seated Lumbar Flexion Stretch  - 1-2 x daily - 7 x weekly - 10 reps  - Standing Lumbar Extension  - 1-2 x daily - 7 x weekly - 10 reps  - X Band Walk  - 7 x weekly - 10 reps  - Modified Jesus Stretch  - 7 x weekly - 1 reps - 2min hold  - Seated Hamstring Stretch  - 7 x weekly - 1 reps - 1min hold  -

## 2024-07-03 ENCOUNTER — HOSPITAL ENCOUNTER (OUTPATIENT)
Facility: HOSPITAL | Age: 49
Setting detail: RECURRING SERIES
Discharge: HOME | End: 2024-07-06
Payer: MEDICAID

## 2024-07-03 PROCEDURE — 97110 THERAPEUTIC EXERCISES: CPT

## 2024-07-03 NOTE — PROGRESS NOTES
PHYSICAL THERAPY - DAILY TREATMENT NOTE (updated 3/23)      Date: 7/3/2024          Patient Name:  Sean August :  1975   Medical   Diagnosis:  Other low back pain [M54.59]  Pain in right arm [M79.601]  Pain in left arm [M79.602]  Pain in left leg [M79.605]  Pain in right leg [M79.604] Treatment Diagnosis:  M79.662  Pain in left lower leg , M79.632  Pain in left forearm , and M54.59  OTHER LOWER BACK PAIN    Referral Source:  Pat Vincent PA-C Insurance:   [facility] / Plan: [facility] [employer] [facility] CARDINAL CARE / Product Type: *No Product type* /                     Patient  verified yes     Visit #   Current  / Total 8 12   Time   In / Out 1400    Total Treatment Time    Total Timed Codes          SUBJECTIVE    Pain Level (0-10 scale): some pain in neck and shoulders, moving improves, knees hurting today    Any medication changes, allergies to medications, adverse drug reactions, diagnosis change, or new procedure performed?: [x] No    [] Yes (see summary sheet for update)  Medications: Verified on Patient Summary List    Subjective functional status/changes:     \"I've been pretty good. My knees and back are okay today. My shoulders are a little tight, my neck is alright, a warm washcloth made it better.\"    OBJECTIVE      Therapeutic Procedures:  Tx Min Billable or 1:1 Min (if diff from Tx Min) Procedure, Rationale, Specifics   30  68950 Therapeutic Exercise (timed):  increase ROM, strength, coordination, balance, and proprioception to improve patient's ability to progress to PLOF and address remaining functional goals. (see flow sheet as applicable)     Details if applicable:    Access Code: [email or web address]  Exercises  - X Band Walk  - 7 x weekly - 10 reps  - Modified Jesus Stretch  - 7 x weekly - 1 reps - 2min hold  - Seated Hamstring Stretch  - 7 x weekly - 1 reps - 1min hold  - Bird Dog  - 7 x weekly - 15 reps  - Single Leg Stance  - 5 reps - 30-60s hold  - Wall Squat  - 3 reps -

## 2025-04-24 NOTE — PERIOP NOTE
Unable to contact pt for arrival time for tomorrow's surgery, pt's listed number is not in service. Attempted to contact pt's emergency contact and that number was also not in service.

## 2025-04-25 ENCOUNTER — ANESTHESIA EVENT (OUTPATIENT)
Facility: HOSPITAL | Age: 50
End: 2025-04-25
Payer: MEDICAID

## 2025-04-25 ENCOUNTER — APPOINTMENT (OUTPATIENT)
Facility: HOSPITAL | Age: 50
End: 2025-04-25
Payer: MEDICAID

## 2025-04-25 ENCOUNTER — ANESTHESIA (OUTPATIENT)
Facility: HOSPITAL | Age: 50
End: 2025-04-25
Payer: MEDICAID

## 2025-04-25 ENCOUNTER — HOSPITAL ENCOUNTER (OUTPATIENT)
Facility: HOSPITAL | Age: 50
Setting detail: OUTPATIENT SURGERY
Discharge: HOME | End: 2025-04-25
Payer: MEDICAID

## 2025-04-25 VITALS
BODY MASS INDEX: 26.68 KG/M2 | SYSTOLIC BLOOD PRESSURE: 137 MMHG | RESPIRATION RATE: 20 BRPM | HEIGHT: 67 IN | DIASTOLIC BLOOD PRESSURE: 93 MMHG | HEART RATE: 92 BPM | WEIGHT: 170 LBS | OXYGEN SATURATION: 98 % | TEMPERATURE: 97.8 F

## 2025-04-25 DIAGNOSIS — M20.21 HALLUX RIGIDUS, RIGHT FOOT: Primary | ICD-10-CM

## 2025-04-25 PROCEDURE — 2500000003 HC RX 250 WO HCPCS

## 2025-04-25 PROCEDURE — 3600000004 HC SURGERY LEVEL 4 BASE

## 2025-04-25 PROCEDURE — 2720000010 HC SURG SUPPLY STERILE

## 2025-04-25 PROCEDURE — 7100000010 HC PHASE II RECOVERY - FIRST 15 MIN

## 2025-04-25 PROCEDURE — C1776 JOINT DEVICE (IMPLANTABLE): HCPCS

## 2025-04-25 PROCEDURE — 3600000014 HC SURGERY LEVEL 4 ADDTL 15MIN

## 2025-04-25 PROCEDURE — 7100000001 HC PACU RECOVERY - ADDTL 15 MIN

## 2025-04-25 PROCEDURE — 6360000002 HC RX W HCPCS

## 2025-04-25 PROCEDURE — 2580000003 HC RX 258: Performed by: NURSE ANESTHETIST, CERTIFIED REGISTERED

## 2025-04-25 PROCEDURE — 2709999900 HC NON-CHARGEABLE SUPPLY

## 2025-04-25 PROCEDURE — 6370000000 HC RX 637 (ALT 250 FOR IP): Performed by: ANESTHESIOLOGY

## 2025-04-25 PROCEDURE — 7100000000 HC PACU RECOVERY - FIRST 15 MIN

## 2025-04-25 PROCEDURE — C1713 ANCHOR/SCREW BN/BN,TIS/BN: HCPCS

## 2025-04-25 PROCEDURE — 73630 X-RAY EXAM OF FOOT: CPT

## 2025-04-25 PROCEDURE — 2500000003 HC RX 250 WO HCPCS: Performed by: NURSE ANESTHETIST, CERTIFIED REGISTERED

## 2025-04-25 PROCEDURE — 3700000000 HC ANESTHESIA ATTENDED CARE

## 2025-04-25 PROCEDURE — 3700000001 HC ADD 15 MINUTES (ANESTHESIA)

## 2025-04-25 PROCEDURE — 6360000002 HC RX W HCPCS: Performed by: NURSE ANESTHETIST, CERTIFIED REGISTERED

## 2025-04-25 PROCEDURE — 7100000011 HC PHASE II RECOVERY - ADDTL 15 MIN

## 2025-04-25 PROCEDURE — 2580000003 HC RX 258: Performed by: ANESTHESIOLOGY

## 2025-04-25 DEVICE — FGT IMPLANT SIZE 30
Type: IMPLANTABLE DEVICE | Site: FOOT | Status: FUNCTIONAL
Brand: SILICONE TOE SYSTEM

## 2025-04-25 RX ORDER — HYDROMORPHONE HYDROCHLORIDE 1 MG/ML
0.5 INJECTION, SOLUTION INTRAMUSCULAR; INTRAVENOUS; SUBCUTANEOUS EVERY 5 MIN PRN
Status: DISCONTINUED | OUTPATIENT
Start: 2025-04-25 | End: 2025-04-25 | Stop reason: HOSPADM

## 2025-04-25 RX ORDER — SODIUM CHLORIDE 0.9 % (FLUSH) 0.9 %
5-40 SYRINGE (ML) INJECTION EVERY 12 HOURS SCHEDULED
Status: DISCONTINUED | OUTPATIENT
Start: 2025-04-25 | End: 2025-04-25 | Stop reason: HOSPADM

## 2025-04-25 RX ORDER — LIDOCAINE HYDROCHLORIDE 20 MG/ML
INJECTION, SOLUTION EPIDURAL; INFILTRATION; INTRACAUDAL; PERINEURAL
Status: DISCONTINUED | OUTPATIENT
Start: 2025-04-25 | End: 2025-04-25 | Stop reason: SDUPTHER

## 2025-04-25 RX ORDER — SODIUM CHLORIDE 9 MG/ML
INJECTION, SOLUTION INTRAVENOUS PRN
Status: DISCONTINUED | OUTPATIENT
Start: 2025-04-25 | End: 2025-04-25 | Stop reason: HOSPADM

## 2025-04-25 RX ORDER — FENTANYL CITRATE 50 UG/ML
25 INJECTION, SOLUTION INTRAMUSCULAR; INTRAVENOUS EVERY 5 MIN PRN
Status: DISCONTINUED | OUTPATIENT
Start: 2025-04-25 | End: 2025-04-25 | Stop reason: HOSPADM

## 2025-04-25 RX ORDER — NALOXONE HYDROCHLORIDE 0.4 MG/ML
INJECTION, SOLUTION INTRAMUSCULAR; INTRAVENOUS; SUBCUTANEOUS PRN
Status: DISCONTINUED | OUTPATIENT
Start: 2025-04-25 | End: 2025-04-25 | Stop reason: HOSPADM

## 2025-04-25 RX ORDER — SODIUM CHLORIDE, SODIUM LACTATE, POTASSIUM CHLORIDE, CALCIUM CHLORIDE 600; 310; 30; 20 MG/100ML; MG/100ML; MG/100ML; MG/100ML
INJECTION, SOLUTION INTRAVENOUS CONTINUOUS
Status: DISCONTINUED | OUTPATIENT
Start: 2025-04-25 | End: 2025-04-25 | Stop reason: HOSPADM

## 2025-04-25 RX ORDER — PROCHLORPERAZINE EDISYLATE 5 MG/ML
5 INJECTION INTRAMUSCULAR; INTRAVENOUS
Status: DISCONTINUED | OUTPATIENT
Start: 2025-04-25 | End: 2025-04-25 | Stop reason: HOSPADM

## 2025-04-25 RX ORDER — OXYCODONE AND ACETAMINOPHEN 7.5; 325 MG/1; MG/1
1 TABLET ORAL EVERY 6 HOURS PRN
Qty: 28 TABLET | Refills: 0 | Status: SHIPPED | OUTPATIENT
Start: 2025-04-25 | End: 2025-05-02

## 2025-04-25 RX ORDER — ACETAMINOPHEN 500 MG
1000 TABLET ORAL ONCE
Status: COMPLETED | OUTPATIENT
Start: 2025-04-25 | End: 2025-04-25

## 2025-04-25 RX ORDER — ONDANSETRON 2 MG/ML
4 INJECTION INTRAMUSCULAR; INTRAVENOUS
Status: DISCONTINUED | OUTPATIENT
Start: 2025-04-25 | End: 2025-04-25 | Stop reason: HOSPADM

## 2025-04-25 RX ORDER — FENTANYL CITRATE 50 UG/ML
INJECTION, SOLUTION INTRAMUSCULAR; INTRAVENOUS
Status: DISCONTINUED | OUTPATIENT
Start: 2025-04-25 | End: 2025-04-25 | Stop reason: SDUPTHER

## 2025-04-25 RX ORDER — HYDRALAZINE HYDROCHLORIDE 20 MG/ML
10 INJECTION INTRAMUSCULAR; INTRAVENOUS
Status: DISCONTINUED | OUTPATIENT
Start: 2025-04-25 | End: 2025-04-25 | Stop reason: HOSPADM

## 2025-04-25 RX ORDER — SUCCINYLCHOLINE/SOD CL,ISO/PF 200MG/10ML
SYRINGE (ML) INTRAVENOUS
Status: DISCONTINUED | OUTPATIENT
Start: 2025-04-25 | End: 2025-04-25 | Stop reason: SDUPTHER

## 2025-04-25 RX ORDER — BUPIVACAINE HYDROCHLORIDE 5 MG/ML
INJECTION, SOLUTION EPIDURAL; INTRACAUDAL; PERINEURAL PRN
Status: DISCONTINUED | OUTPATIENT
Start: 2025-04-25 | End: 2025-04-25 | Stop reason: HOSPADM

## 2025-04-25 RX ORDER — SODIUM CHLORIDE, SODIUM LACTATE, POTASSIUM CHLORIDE, CALCIUM CHLORIDE 600; 310; 30; 20 MG/100ML; MG/100ML; MG/100ML; MG/100ML
INJECTION, SOLUTION INTRAVENOUS
Status: DISCONTINUED | OUTPATIENT
Start: 2025-04-25 | End: 2025-04-25 | Stop reason: SDUPTHER

## 2025-04-25 RX ORDER — ONDANSETRON 4 MG/1
4 TABLET, FILM COATED ORAL 3 TIMES DAILY PRN
Qty: 15 TABLET | Refills: 0 | Status: SHIPPED | OUTPATIENT
Start: 2025-04-25 | End: 2025-04-30

## 2025-04-25 RX ORDER — FENTANYL CITRATE 50 UG/ML
100 INJECTION, SOLUTION INTRAMUSCULAR; INTRAVENOUS
Status: DISCONTINUED | OUTPATIENT
Start: 2025-04-25 | End: 2025-04-25 | Stop reason: HOSPADM

## 2025-04-25 RX ORDER — MIDAZOLAM HYDROCHLORIDE 1 MG/ML
INJECTION, SOLUTION INTRAMUSCULAR; INTRAVENOUS
Status: DISCONTINUED | OUTPATIENT
Start: 2025-04-25 | End: 2025-04-25 | Stop reason: SDUPTHER

## 2025-04-25 RX ORDER — PROPOFOL 10 MG/ML
INJECTION, EMULSION INTRAVENOUS
Status: DISCONTINUED | OUTPATIENT
Start: 2025-04-25 | End: 2025-04-25 | Stop reason: SDUPTHER

## 2025-04-25 RX ORDER — SODIUM CHLORIDE 0.9 % (FLUSH) 0.9 %
5-40 SYRINGE (ML) INJECTION PRN
Status: DISCONTINUED | OUTPATIENT
Start: 2025-04-25 | End: 2025-04-25 | Stop reason: HOSPADM

## 2025-04-25 RX ORDER — LORAZEPAM 2 MG/ML
0.5 INJECTION INTRAMUSCULAR
Status: DISCONTINUED | OUTPATIENT
Start: 2025-04-25 | End: 2025-04-25 | Stop reason: HOSPADM

## 2025-04-25 RX ORDER — GLYCOPYRROLATE 0.2 MG/ML
INJECTION, SOLUTION INTRAMUSCULAR; INTRAVENOUS
Status: DISCONTINUED | OUTPATIENT
Start: 2025-04-25 | End: 2025-04-25 | Stop reason: SDUPTHER

## 2025-04-25 RX ORDER — DEXAMETHASONE SODIUM PHOSPHATE 4 MG/ML
INJECTION, SOLUTION INTRA-ARTICULAR; INTRALESIONAL; INTRAMUSCULAR; INTRAVENOUS; SOFT TISSUE
Status: DISCONTINUED | OUTPATIENT
Start: 2025-04-25 | End: 2025-04-25 | Stop reason: SDUPTHER

## 2025-04-25 RX ORDER — LIDOCAINE HYDROCHLORIDE 10 MG/ML
1 INJECTION, SOLUTION EPIDURAL; INFILTRATION; INTRACAUDAL; PERINEURAL
Status: DISCONTINUED | OUTPATIENT
Start: 2025-04-25 | End: 2025-04-25 | Stop reason: HOSPADM

## 2025-04-25 RX ORDER — MIDAZOLAM HYDROCHLORIDE 2 MG/2ML
2 INJECTION, SOLUTION INTRAMUSCULAR; INTRAVENOUS AS NEEDED
Status: DISCONTINUED | OUTPATIENT
Start: 2025-04-25 | End: 2025-04-25 | Stop reason: HOSPADM

## 2025-04-25 RX ORDER — DIPHENHYDRAMINE HYDROCHLORIDE 50 MG/ML
12.5 INJECTION, SOLUTION INTRAMUSCULAR; INTRAVENOUS
Status: DISCONTINUED | OUTPATIENT
Start: 2025-04-25 | End: 2025-04-25 | Stop reason: HOSPADM

## 2025-04-25 RX ORDER — IPRATROPIUM BROMIDE AND ALBUTEROL SULFATE 2.5; .5 MG/3ML; MG/3ML
1 SOLUTION RESPIRATORY (INHALATION)
Status: DISCONTINUED | OUTPATIENT
Start: 2025-04-25 | End: 2025-04-25 | Stop reason: HOSPADM

## 2025-04-25 RX ORDER — OXYCODONE HYDROCHLORIDE 5 MG/1
5 TABLET ORAL
Status: DISCONTINUED | OUTPATIENT
Start: 2025-04-25 | End: 2025-04-25 | Stop reason: HOSPADM

## 2025-04-25 RX ORDER — ONDANSETRON 2 MG/ML
INJECTION INTRAMUSCULAR; INTRAVENOUS
Status: DISCONTINUED | OUTPATIENT
Start: 2025-04-25 | End: 2025-04-25 | Stop reason: SDUPTHER

## 2025-04-25 RX ORDER — ROCURONIUM BROMIDE 10 MG/ML
INJECTION, SOLUTION INTRAVENOUS
Status: DISCONTINUED | OUTPATIENT
Start: 2025-04-25 | End: 2025-04-25 | Stop reason: SDUPTHER

## 2025-04-25 RX ORDER — DEXMEDETOMIDINE HYDROCHLORIDE 100 UG/ML
INJECTION, SOLUTION INTRAVENOUS
Status: DISCONTINUED | OUTPATIENT
Start: 2025-04-25 | End: 2025-04-25 | Stop reason: SDUPTHER

## 2025-04-25 RX ADMIN — DEXAMETHASONE SODIUM PHOSPHATE 4 MG: 4 INJECTION INTRA-ARTICULAR; INTRALESIONAL; INTRAMUSCULAR; INTRAVENOUS; SOFT TISSUE at 13:11

## 2025-04-25 RX ADMIN — WATER 2000 MG: 1 INJECTION INTRAMUSCULAR; INTRAVENOUS; SUBCUTANEOUS at 13:12

## 2025-04-25 RX ADMIN — MIDAZOLAM HYDROCHLORIDE 3 MG: 1 INJECTION, SOLUTION INTRAMUSCULAR; INTRAVENOUS at 12:51

## 2025-04-25 RX ADMIN — SODIUM CHLORIDE, POTASSIUM CHLORIDE, SODIUM LACTATE AND CALCIUM CHLORIDE: 600; 310; 30; 20 INJECTION, SOLUTION INTRAVENOUS at 12:57

## 2025-04-25 RX ADMIN — DEXMEDETOMIDINE 10 MCG: 100 INJECTION, SOLUTION INTRAVENOUS at 14:25

## 2025-04-25 RX ADMIN — ROCURONIUM BROMIDE 10 MG: 50 INJECTION INTRAVENOUS at 13:02

## 2025-04-25 RX ADMIN — ROCURONIUM BROMIDE 40 MG: 50 INJECTION INTRAVENOUS at 13:06

## 2025-04-25 RX ADMIN — PROPOFOL 200 MG: 10 INJECTION, EMULSION INTRAVENOUS at 13:04

## 2025-04-25 RX ADMIN — LIDOCAINE HYDROCHLORIDE 100 MG: 20 INJECTION, SOLUTION EPIDURAL; INFILTRATION; INTRACAUDAL; PERINEURAL at 13:02

## 2025-04-25 RX ADMIN — SODIUM CHLORIDE, SODIUM LACTATE, POTASSIUM CHLORIDE, AND CALCIUM CHLORIDE: .6; .31; .03; .02 INJECTION, SOLUTION INTRAVENOUS at 12:06

## 2025-04-25 RX ADMIN — PROPOFOL 20 MG: 10 INJECTION, EMULSION INTRAVENOUS at 14:32

## 2025-04-25 RX ADMIN — SUGAMMADEX 200 MG: 100 INJECTION, SOLUTION INTRAVENOUS at 14:32

## 2025-04-25 RX ADMIN — PHENYLEPHRINE HYDROCHLORIDE 50 MCG/MIN: 10 INJECTION INTRAVENOUS at 13:19

## 2025-04-25 RX ADMIN — Medication 25 MG: at 13:07

## 2025-04-25 RX ADMIN — ACETAMINOPHEN 1000 MG: 500 TABLET ORAL at 11:59

## 2025-04-25 RX ADMIN — ONDANSETRON 4 MG: 2 INJECTION, SOLUTION INTRAMUSCULAR; INTRAVENOUS at 13:11

## 2025-04-25 RX ADMIN — Medication 140 MG: at 13:02

## 2025-04-25 RX ADMIN — FENTANYL CITRATE 50 MCG: 50 INJECTION INTRAMUSCULAR; INTRAVENOUS at 13:02

## 2025-04-25 RX ADMIN — MIDAZOLAM HYDROCHLORIDE 2 MG: 1 INJECTION, SOLUTION INTRAMUSCULAR; INTRAVENOUS at 12:57

## 2025-04-25 RX ADMIN — FENTANYL CITRATE 50 MCG: 50 INJECTION INTRAMUSCULAR; INTRAVENOUS at 13:07

## 2025-04-25 RX ADMIN — Medication 25 MG: at 13:02

## 2025-04-25 RX ADMIN — GLYCOPYRROLATE 0.1 MG: 0.2 INJECTION, SOLUTION INTRAMUSCULAR; INTRAVENOUS at 14:24

## 2025-04-25 ASSESSMENT — PAIN SCALES - GENERAL: PAINLEVEL_OUTOF10: 0

## 2025-04-25 ASSESSMENT — PAIN - FUNCTIONAL ASSESSMENT: PAIN_FUNCTIONAL_ASSESSMENT: 0-10

## 2025-04-25 ASSESSMENT — LIFESTYLE VARIABLES: SMOKING_STATUS: 1

## 2025-04-25 ASSESSMENT — PAIN DESCRIPTION - DESCRIPTORS: DESCRIPTORS: ACHING

## 2025-04-25 NOTE — BRIEF OP NOTE
Brief Postoperative Note      Patient: Sean August  YOB: 1975  MRN: [id number]    Date of Procedure: 4/25/2025    Pre-Op Diagnosis Codes:      * Hallux rigidus of right foot [M20.21]     * Ganglion cyst of foot [M67.479]    Post-Op Diagnosis:   Hallux rigidus right foot [M20.21]       Procedure:  First metatarsophalangeal joint replacement right foot    Surgeon:  Melinda Hyde DPM    Assistant:  Ledy Bass DPM (fellow)    Anesthesia: Monitor Anesthesia Care    Estimated Blood Loss (mL): less than 50     Complications: None    Specimens:   * No specimens in log *    Implants:  Implant Name Type Inv. Item Serial No.  Lot No. LRB No. Used Action   IMPLANT TOE JT SZ 30 FOREFOOT ALEX FLX ARTHROPLASTY - SNA  IMPLANT TOE JT SZ 30 FOREFOOT ALEX FLX ARTHROPLASTY NA [employer] [id number] Right 1 Implanted         Drains: * No LDAs found *    Findings:  Infection Present At Time Of Surgery (PATOS) (choose all levels that have infection present):  No infection present  Other Findings: no obvious ganglion cyst    Electronically signed by Melinda Hyde DPM on 4/25/2025 at 2:35 PM

## 2025-04-25 NOTE — ANESTHESIA PRE PROCEDURE
Department of Anesthesiology  Preprocedure Note       Name:  Sean August   Age:  49 y.o.  :  1975                                          MRN:  [id number]         Date:  2025      Surgeon: Surgeon(s):  Melinda Hyde DPM    Procedure: Procedure(s):  FIRST METATARSAL PHALANGEAL JOINT REPLACEMENT, GANGLION CYST EXCISION RIGHT FOOT    Medications prior to admission:   Prior to Admission medications    Medication Sig Start Date End Date Taking? Authorizing Provider   acetaminophen (TYLENOL) 650 MG extended release tablet Take by mouth 3 times daily as needed 17  Yes Automatic Reconciliation, Ar   albuterol sulfate HFA (VENTOLIN HFA) 108 (90 Base) MCG/ACT inhaler inhale 2 puffs by mouth every 6 hours if needed for BREATH OR WHEEZING 16  Yes Automatic Reconciliation, Ar   atenolol (TENORMIN) 25 MG tablet take 1 tablet by mouth once daily for high blood pressure 17  Yes Automatic Reconciliation, Ar   potassium chloride (KLOR-CON M) 20 MEQ extended release tablet Take 1 tablet by mouth daily for 7 days 10/19/23 10/26/23  Jeffery Parsons MD   aspirin 81 MG EC tablet Take by mouth daily 17   Automatic Reconciliation, Ar   atorvastatin (LIPITOR) 40 MG tablet take 1 tablet by mouth once daily for cholesterol 17   Automatic Reconciliation, Ar   diclofenac sodium (VOLTAREN) 1 % GEL Apply topically 4 times daily 19   Automatic Reconciliation, Ar   ergocalciferol (ERGOCALCIFEROL) 1.25 MG (68957 UT) capsule Take by mouth every 7 days 16   Automatic Reconciliation, Ar   hydrOXYzine pamoate (VISTARIL) 25 MG capsule take 1 capsule by mouth three times a day if needed for anxiety  Patient not taking: Reported on 2025   Automatic Reconciliation, Ar   ipratropium (ATROVENT HFA) 17 MCG/ACT inhaler Take 2 puffs TWICE DAILY, up to four times daily for shortness of breath and wheezing.   Indications: BRONCHOSPASM PREVENTION WITH COPD 10/10/16   Automatic Reconciliation, Ar

## 2025-04-25 NOTE — ANESTHESIA POSTPROCEDURE EVALUATION
Post-Anesthesia Evaluation and Assessment    Patient: Sean August MRN: [id number]  SSN: [id number]    YOB: 1975  Age: 49 y.o.  Sex: male      I have evaluated the patient and they are stable and ready for discharge from the PACU.     Cardiovascular Function/Vital Signs  Visit Vitals  /78   Pulse 69   Temp 97 °F (36.1 °C) (Axillary)   Resp 13   Ht 1.702 m (5' 7\")   Wt 77.1 kg (170 lb)   SpO2 97%   BMI 26.63 kg/m²       Patient is status post Monitor Anesthesia Care anesthesia for Procedure(s):  FIRST METATARSAL PHALANGEAL JOINT REPLACEMENT, GANGLION CYST EXCISION RIGHT FOOT.    Nausea/Vomiting: None    Postoperative hydration reviewed and adequate.    Pain:      Managed    Neurological Status:       At baseline    Mental Status, Level of Consciousness: Alert and  oriented to person, place, and time    Pulmonary Status:       Adequate oxygenation and airway patent    Complications related to anesthesia: None    Post-anesthesia assessment completed. No concerns    Signed By: Frederic Seals MD     April 25, 2025

## 2025-04-25 NOTE — DISCHARGE INSTRUCTIONS
smelly discharge from incision  Loss of sensation -cold, white, or blue toes    AFTER ANESTHESIA :   For the first 24 hours: do not drive, drink alcoholic beverages, or make important decisions.  Be aware of dizziness following anesthesia and while taking pain medication.      DIS[facility]GE MEDICATIONS: [facility]      APPOINTMENT DATE/TIME: please call for an appointment    YOUR DOCTOR’S PHONE NUMBER: [phone]    Patient’s signature:  Date: 4/25/2025  Discharging Nurse:

## 2025-04-25 NOTE — OP NOTE
Operative Note      Patient: Sean August  YOB: 1975  MRN: [id number]    Date of Procedure: 4/25/2025    Pre-Op Diagnosis Codes:      * Hallux rigidus of right foot [M20.21]     * Ganglion cyst of foot [M67.479]    Post-Op Diagnosis:  Hallux rigidus right foot [M20.21]       Procedures: First metatarsophalangeal joint replacement right foot    Surgeon:  Melinda Hyde DPM    Assistant:   Ledy Bass DPM    Anesthesia: Monitor Anesthesia Care    Estimated Blood Loss (mL): less than 50     Complications: None    Specimens:   * No specimens in log *    Implants:  Implant Name Type Inv. Item Serial No.  Lot No. LRB No. Used Action   IMPLANT TOE JT SZ 30 FOREFOOT ALEX FLX ARTHROPLASTY - SNA  IMPLANT TOE JT SZ 30 FOREFOOT ALEX FLX ARTHROPLASTY NA [employer] [id number] Right 1 Implanted         Drains: * No LDAs found *    Findings:  Infection Present At Time Of Surgery (PATOS) (choose all levels that have infection present):  No infection present  Other Findings: no obvious ganglion cyst    Detailed Description of Procedure:   Patient was wheeled to the operating room and placed on the operating table in the supine position. General anesthesia was achieved by the anesthesia team. A tourniquet was applied to the right ankle. DP and PT pulses were felt to be 2/4 with cap refill < 3 seconds to the hallux. The right foot was prepped and draped in the normal aseptic technique. Ankle tourniquet was inflated to 250mmhg. 10cc of 0.5% marcaine plain was injected around the first ray. A dorsolinear incision was made with a #15 blade over the first metatarsophalangeal joint and the first MPJ was found to have a mild joint effusion overlying dorsal spur and no true ganglion cyst. The capsule was incised through a dorsomedial incision with a #15 blade. The first metatarsophalangeal joint was exposed through a mix of sharp and blunt dissection. The sesamoids were freed with a

## (undated) DEVICE — FGT SYSTEM PROXIMAL BROACH SMALL: Brand: SILICONE TOE SYSTEM

## (undated) DEVICE — BANDAGE COMPR W4INXL5YD WHT BGE POLY COT M E WRP WV HK AND

## (undated) DEVICE — DISPOSABLE TOURNIQUET CUFF SINGLE BLADDER, DUAL PORT AND QUICK CONNECT CONNECTOR: Brand: COLOR CUFF

## (undated) DEVICE — SUTURE MONOCRYL STRATAFIX SPRL + SZ 4-0 L12IN ABSRB UD PS-2 SXMP1B117

## (undated) DEVICE — SYRINGE MED 10ML LUERLOCK TIP W/O SFTY DISP

## (undated) DEVICE — BANDAGE GZ W2XL75IN ST RAYON POLY CNFRM STRTCH LTWT

## (undated) DEVICE — SOLUTION IRRIG 1000ML 09% SOD CHL USP PIC PLAS CONTAINER

## (undated) DEVICE — X-RAY DETECTABLE SPONGES,16 PLY: Brand: VISTEC

## (undated) DEVICE — PADDING UNDERCAST W3INXL12FT RAYON POLY SYN NONADHESIVE

## (undated) DEVICE — PRECISION THIN (9.0 X 0.38 X 31.0MM)

## (undated) DEVICE — EXTREMITY - SMH: Brand: MEDLINE INDUSTRIES, INC.

## (undated) DEVICE — SUTURE VICRYL SZ 5-0 L18IN ABSRB UD L13MM P-3 3/8 CIR PRIM J493G

## (undated) DEVICE — DRAPE C ARM W/ POLY STRP W42XL72IN FOR MOB XR

## (undated) DEVICE — CELLERATERX® SURGICAL ACTIVATED COLLAGEN® POWDER IS TYPE I BOVINE HYDROLYZED COLLAGEN AND CONTAINS NO ADDITIVES.: Brand: CELLERATERX SURGICAL

## (undated) DEVICE — SUTURE NONABSORBABLE MONOFILAMENT 3-0 PS-1 18 IN BLK ETHILON 1663H

## (undated) DEVICE — GLOVE ORANGE PI 7 1/2   MSG9075

## (undated) DEVICE — FGT SYSTEM DISTAL BROACH SMALL: Brand: SILICONE TOE SYSTEM

## (undated) DEVICE — SOLUTION SURG PREP 26 CC PURPREP

## (undated) DEVICE — 4.0MM EGG

## (undated) DEVICE — DRESSING PETRO W3XL8IN OIL EMUL N ADH GZ KNIT IMPREG CELOS

## (undated) DEVICE — BANDAGE,GAUZE,BULKEE II,4.5"X4.1YD,STRL: Brand: MEDLINE

## (undated) DEVICE — SUTURE VICRYL 2-0 L27IN ABSRB UD PS-2 L19MM 1/2 CIR J428H

## (undated) DEVICE — SUTURE VICRYL + SZ 4-0 L27IN ABSRB UD PS-2 3/8 CIR REV CUT VCP426H

## (undated) DEVICE — ZIMMER® STERILE DISPOSABLE TOURNIQUET CUFF WITH PLC, DUAL PORT, SINGLE BLADDER, 18 IN. (46 CM)

## (undated) DEVICE — SYRINGE,EAR/ULCER, 2 OZ, STERILE: Brand: MEDLINE

## (undated) DEVICE — HYPODERMIC SAFETY NEEDLE: Brand: MONOJECT

## (undated) DEVICE — SUTURE VICRYL + SZ 3-0 L27IN ABSRB UD L26MM SH 1/2 CIR VCP416H

## (undated) DEVICE — SCREW EXT FIX L14MM FOR DISTRCTN

## (undated) DEVICE — BANDAGE,ELASTIC,ESMARK,STERILE,4"X9',LF: Brand: MEDLINE

## (undated) DEVICE — COVER,MAYO STAND,STERILE: Brand: MEDLINE

## (undated) DEVICE — MAGNETIC INSTR DRAPE 20X16: Brand: MEDLINE INDUSTRIES, INC.